# Patient Record
Sex: MALE | Race: OTHER | HISPANIC OR LATINO | URBAN - METROPOLITAN AREA
[De-identification: names, ages, dates, MRNs, and addresses within clinical notes are randomized per-mention and may not be internally consistent; named-entity substitution may affect disease eponyms.]

---

## 2022-02-26 ENCOUNTER — EMERGENCY (EMERGENCY)
Facility: HOSPITAL | Age: 42
LOS: 0 days | Discharge: HOME | End: 2022-02-26
Attending: STUDENT IN AN ORGANIZED HEALTH CARE EDUCATION/TRAINING PROGRAM
Payer: MEDICAID

## 2022-02-26 VITALS
TEMPERATURE: 98 F | SYSTOLIC BLOOD PRESSURE: 146 MMHG | WEIGHT: 244.93 LBS | HEIGHT: 71 IN | RESPIRATION RATE: 19 BRPM | OXYGEN SATURATION: 100 % | HEART RATE: 66 BPM | DIASTOLIC BLOOD PRESSURE: 90 MMHG

## 2022-02-26 DIAGNOSIS — M79.601 PAIN IN RIGHT ARM: ICD-10-CM
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
## 2022-02-26 DIAGNOSIS — M54.50 LOW BACK PAIN, UNSPECIFIED: ICD-10-CM
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
## 2022-02-26 DIAGNOSIS — Z90.49 ACQUIRED ABSENCE OF OTHER SPECIFIED PARTS OF DIGESTIVE TRACT: Chronic | ICD-10-CM

## 2022-02-26 DIAGNOSIS — S39.012A STRAIN OF MUSCLE, FASCIA AND TENDON OF LOWER BACK, INITIAL ENCOUNTER: ICD-10-CM

## 2022-02-26 DIAGNOSIS — T14.8XXA OTHER INJURY OF UNSPECIFIED BODY REGION, INITIAL ENCOUNTER: ICD-10-CM

## 2022-02-26 PROCEDURE — 70450 CT HEAD/BRAIN W/O DYE: CPT | Mod: 26,MA

## 2022-02-26 PROCEDURE — 73030 X-RAY EXAM OF SHOULDER: CPT | Mod: 26,RT

## 2022-02-26 PROCEDURE — 73060 X-RAY EXAM OF HUMERUS: CPT | Mod: 26,RT

## 2022-02-26 PROCEDURE — 99285 EMERGENCY DEPT VISIT HI MDM: CPT

## 2022-02-26 PROCEDURE — 72131 CT LUMBAR SPINE W/O DYE: CPT | Mod: 26,MA

## 2022-02-26 PROCEDURE — 73080 X-RAY EXAM OF ELBOW: CPT | Mod: 26,RT

## 2022-02-26 PROCEDURE — 72125 CT NECK SPINE W/O DYE: CPT | Mod: 26,MA

## 2022-02-26 RX ORDER — ACETAMINOPHEN 500 MG
975 TABLET ORAL ONCE
Refills: 0 | Status: COMPLETED | OUTPATIENT
Start: 2022-02-26 | End: 2022-02-26

## 2022-02-26 RX ORDER — DIAZEPAM 5 MG
5 TABLET ORAL ONCE
Refills: 0 | Status: DISCONTINUED | OUTPATIENT
Start: 2022-02-26 | End: 2022-02-26

## 2022-02-26 RX ORDER — METHOCARBAMOL 500 MG/1
2 TABLET, FILM COATED ORAL
Qty: 30 | Refills: 0
Start: 2022-02-26 | End: 2022-03-02

## 2022-02-26 RX ORDER — IBUPROFEN 200 MG
1 TABLET ORAL
Qty: 20 | Refills: 0
Start: 2022-02-26 | End: 2022-03-02

## 2022-02-26 RX ORDER — LIDOCAINE 4 G/100G
2 CREAM TOPICAL ONCE
Refills: 0 | Status: COMPLETED | OUTPATIENT
Start: 2022-02-26 | End: 2022-02-26

## 2022-02-26 RX ORDER — TRAMADOL HYDROCHLORIDE 50 MG/1
50 TABLET ORAL ONCE
Refills: 0 | Status: DISCONTINUED | OUTPATIENT
Start: 2022-02-26 | End: 2022-02-26

## 2022-02-26 RX ADMIN — Medication 975 MILLIGRAM(S): at 18:14

## 2022-02-26 RX ADMIN — Medication 5 MILLIGRAM(S): at 16:35

## 2022-02-26 RX ADMIN — TRAMADOL HYDROCHLORIDE 50 MILLIGRAM(S): 50 TABLET ORAL at 18:23

## 2022-02-26 RX ADMIN — LIDOCAINE 2 PATCH: 4 CREAM TOPICAL at 16:35

## 2022-02-26 RX ADMIN — Medication 975 MILLIGRAM(S): at 16:35

## 2022-02-26 NOTE — ED ADULT NURSE NOTE - OBJECTIVE STATEMENT
c/o slip and fall on ice with pain to mid low back and right arm. limited ROM on right arm. denies LOC/HA/dizziness/n/v/d at this time

## 2022-02-26 NOTE — ED PROVIDER NOTE - CLINICAL SUMMARY MEDICAL DECISION MAKING FREE TEXT BOX
42y Male with no sig PMHx presents to the ER for fall. 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 +mid-low back pain and right arm pain. Denies pain medications, blood thinners, numbness, tingling, bowel or bladder incontinence or retention, saddle anesthesia. Vital signs stable, exam as noted above. Concern for MSK pain/spasm vs fracture - will get XR, CTs, meds, reassess. Dispo pending results.

## 2022-02-26 NOTE — ED PROVIDER NOTE - PATIENT PORTAL LINK FT
You can access the FollowMyHealth Patient Portal offered by [facility] by registering at the following website: [email or web address]. By joining [employer]’s FollowMyHealth portal, you will also be able to view your health information using other applications (apps) compatible with our system.

## 2022-02-26 NOTE — ED PROVIDER NOTE - NS ED ROS FT
Constitutional: (-) Fever, (-) Chills  Skin: (-) Rashes  Eyes: (-) Visual changes, (-) Discharge, (-) Redness  Ears: (-) Hearing loss, (-)Tinnitus, (-) Ear pain  Nose: (-) Nasal congestion, (-) Runny nose  Mouth/Throat: (-) Sore throat  CV: (-) Chest pain  Resp: (-) Cough, (-) Shortness of breath, (-) Dyspnea on Exertion, (-) Wheezing  GI: (-) Abdominal pain, (-) Nausea, (-) Vomiting, (-) Diarrhea  : (-) Dysuria   MSK: (+)Back pain, (+) Myalgias  Neuro: (-) Headache, (-)LOC

## 2022-02-26 NOTE — ED PROVIDER NOTE - ATTENDING CONTRIBUTION TO CARE
I have seen the patient with the PA and agree with above examination and assessment and plan with the following addendum:    42 year old male presents today s/p slip and fall, pt reports slipping on ice as he got out of his car, now with right shoulder, arm and elbow pain, +head contusion ?LOC, and lower back pain    Focused PE:   General: NAD, alert and oriented  Head: Normocephalic, atraumatic  Eyes: PERRLA, EOMI  Cardiac: RRR, no murmurs, rubs or gallops  Resp: CTA, no wheezes, rales or ronchi  GI: Nondistended, nontender, no rebound or guarding  MSK: +right shoulder/arm /elbow tenderness +LS midline spinal tenderness +paraspinal cervical and lumbar muscle tenderness  Neuro: Alert and oriented, no focal deficits. Normal gait  Ext: Non edematous, nontender.    A/P: pain control, ct, xray, likely discharge is imaging is negative with pain medication

## 2022-02-26 NOTE — ED ADULT NURSE REASSESSMENT NOTE - NS ED NURSE REASSESS COMMENT FT1
Covering RN: Pt resting in stretcher, axo4, reporting to still have pain, 7/10. MD Garcia made aware.

## 2022-02-26 NOTE — ED PROVIDER NOTE - OBJECTIVE STATEMENT
42y Male with no sig PMHx presents to the ER for fall. 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 Unsure if he hit his head or loss consciousness. Reports mid-low back pain and right arm pain. Denies pain medications, blood thinners, numbness, tingling, bowel or bladder incontinence or retention, saddle anesthesia. Reports pain worse with ambulation. Declines

## 2023-02-23 PROBLEM — Z78.9 OTHER SPECIFIED HEALTH STATUS: Chronic | Status: ACTIVE | Noted: 2022-02-26

## 2023-02-23 PROBLEM — Z00.00 ENCOUNTER FOR PREVENTIVE HEALTH EXAMINATION: Status: ACTIVE | Noted: 2023-02-23

## 2023-04-11 ENCOUNTER — APPOINTMENT (OUTPATIENT)
Dept: ORTHOPEDIC SURGERY | Facility: CLINIC | Age: 43
End: 2023-04-11
Payer: COMMERCIAL

## 2023-04-11 VITALS — HEIGHT: 71 IN | WEIGHT: 260 LBS | BODY MASS INDEX: 36.4 KG/M2

## 2023-04-11 DIAGNOSIS — M51.36 OTHER INTERVERTEBRAL DISC DEGENERATION, LUMBAR REGION: ICD-10-CM

## 2023-04-11 DIAGNOSIS — M21.372 FOOT DROP, LEFT FOOT: ICD-10-CM

## 2023-04-11 PROCEDURE — 99204 OFFICE O/P NEW MOD 45 MIN: CPT

## 2023-04-11 PROCEDURE — 72050 X-RAY EXAM NECK SPINE 4/5VWS: CPT

## 2023-04-11 PROCEDURE — 72110 X-RAY EXAM L-2 SPINE 4/>VWS: CPT

## 2023-04-11 PROCEDURE — 72170 X-RAY EXAM OF PELVIS: CPT

## 2023-04-11 RX ORDER — METHYLPREDNISOLONE 4 MG/1
4 TABLET ORAL
Qty: 1 | Refills: 0 | Status: ACTIVE | COMMUNITY
Start: 2023-04-11 | End: 1900-01-01

## 2023-04-11 RX ORDER — GABAPENTIN 100 MG/1
100 CAPSULE ORAL
Qty: 60 | Refills: 2 | Status: ACTIVE | COMMUNITY
Start: 2023-04-11 | End: 1900-01-01

## 2023-04-11 RX ORDER — NAPROXEN 500 MG/1
500 TABLET ORAL
Qty: 60 | Refills: 0 | Status: ACTIVE | COMMUNITY
Start: 2023-04-11 | End: 1900-01-01

## 2023-04-11 NOTE — ASSESSMENT
[FreeTextEntry1] : Severe LLE radiculopathy with partial foot drop\par MDP, iam, nsaids\par Will obtain MRI to rule out HNP; will also be used to guide potential future injections/surgical management. \par \par NSAIDs- Patient warned of risk of medication to GI tract, increased blood pressure, cardiac risk, and risk of fluid retention.  Advised to clear medication with internist or PCP if any concurrent health problem with heart, blood pressure, or GI system exists.\par Gabapentin- Patient advised of sedating effects, instructed not to drive, operate machinery, or take with other sedating medications. Advised of need to taper on/off medication and risk of abruptly stopping gabapentin.

## 2023-04-11 NOTE — IMAGING
[Straightening consistent with spasm] : Straightening consistent with spasm [Disc space narrowing] : Disc space narrowing [AP] : anteroposterior [There are no fractures, subluxations or dislocations. No significant abnormalities are seen] : There are no fractures, subluxations or dislocations. No significant abnormalities are seen [de-identified] : LSPINE\par Inspection: No rash or ecchymosis\par Palpation: L SIJ TTP\par ROM: limited all planes\par Strength: 5/5 RIGHT hip flexors, knee extensors, ankle dorsiflexors, EHL, ankle plantarflexors. Left TA/EHL 4/5, otherwise 5/5\par Sensation: Sensation present to light touch bilateral L2-S1 distributions, N/T posterolateral L leg\par Provocative maneuvers: + Right contra-lateral straight leg raise; + L SLR\par \par Bilateral hips-\par Palpation: No tenderness to palpation over greater trochanter or IT band\par ROM: No pain with flexion and internal rotation

## 2023-04-11 NOTE — HISTORY OF PRESENT ILLNESS
[Burning] : burning [Sharp] : sharp [Stabbing] : stabbing [Constant] : constant [Nothing helps with pain getting better] : Nothing helps with pain getting better [Standing] : standing [Walking] : walking [Exercising] : exercising [10] : 10 [de-identified] : PMH healthy\par PSH appy\par SH no tob, occ etoh, no drugs\par Occ: construction\par \par \par 4/11/23-\par PRISCILA 43 year old M here for Lower back, onset pain sine 2/11-18/23, pt was getting out a truck and slipped on ice, pt felt the pain and was unable to walk, he was taken to the ER, pt completed x rays\par pt was given muscle relaxer and pain medication , which provided only temporary relief, managed by hospital and PCP\par numbness down the LLE to the toes. + subjective L foot weakness\par pt tried a chiropractor but the relief was only during the sessions \par No bb dysfunction [] : no [FreeTextEntry6] : pinching sensation  [FreeTextEntry7] : OYSHI  [de-identified] : bending [de-identified] : 11/2023 [de-identified] : PCP [de-identified] : x ray

## 2023-04-20 ENCOUNTER — FORM ENCOUNTER (OUTPATIENT)
Age: 43
End: 2023-04-20

## 2023-04-20 ENCOUNTER — APPOINTMENT (OUTPATIENT)
Dept: MRI IMAGING | Facility: CLINIC | Age: 43
End: 2023-04-20

## 2023-04-21 ENCOUNTER — APPOINTMENT (OUTPATIENT)
Dept: MRI IMAGING | Facility: CLINIC | Age: 43
End: 2023-04-21
Payer: COMMERCIAL

## 2023-04-21 PROCEDURE — 72148 MRI LUMBAR SPINE W/O DYE: CPT

## 2023-05-30 ENCOUNTER — APPOINTMENT (OUTPATIENT)
Dept: ORTHOPEDIC SURGERY | Facility: CLINIC | Age: 43
End: 2023-05-30
Payer: COMMERCIAL

## 2023-05-30 PROCEDURE — 99215 OFFICE O/P EST HI 40 MIN: CPT

## 2023-05-30 NOTE — REASON FOR VISIT
[FreeTextEntry1] : 42-year-old male with a past medical history significant for\par \par Appendectomy\par \par Review of systems\par 14 point review of systems otherwise unremarkable separate described above in the history of present illness.  The patient was seen on 2/26 of 2022 following fall when he slipped on ice and fell on his back.  Imaging on 2/26 2022 demonstrated no acute intracranial findings of CT scan of the head or CT cervical spine.  No acute fracture of lumbar spine.  There is congenital spinal canal stenosis at least moderate at L3-L4 and L4-L5.  X-ray of the shoulder demonstrate no acute fracture or dislocation.  X-ray of right humerus demonstrate no acute fracture or dislocation.  X-ray of elbow demonstrate no acute fracture or dislocation.\par \par Social history\par No alcohol use, tobacco use or illicit drug use\par \par Past medical history\par Noncontributory\par \par Past surgical history\par Appendectomy\par \par Family history\par Noncontributory for premature coronary disease or sudden cardiac death\par \par All questions and concerns of the patient were addressed.

## 2023-06-01 ENCOUNTER — APPOINTMENT (OUTPATIENT)
Dept: CARDIOLOGY | Facility: CLINIC | Age: 43
End: 2023-06-01

## 2023-06-08 NOTE — IMAGING
[Straightening consistent with spasm] : Straightening consistent with spasm [Disc space narrowing] : Disc space narrowing [AP] : anteroposterior [There are no fractures, subluxations or dislocations. No significant abnormalities are seen] : There are no fractures, subluxations or dislocations. No significant abnormalities are seen [de-identified] : LSPINE\par Inspection: No rash or ecchymosis\par Palpation: L SIJ TTP\par ROM: limited all planes\par Strength: 5/5 RIGHT hip flexors, knee extensors, ankle dorsiflexors, EHL, ankle plantarflexors. Left TA/EHL 4/5, otherwise 5/5\par Sensation: Sensation present to light touch bilateral L2-S1 distributions, N/T posterolateral L leg to dorsal and plantar foot\par Provocative maneuvers: + Right contra-lateral straight leg raise; + L SLR\par \par Bilateral hips-\par Palpation: No tenderness to palpation over greater trochanter or IT band\par ROM: No pain with flexion and internal rotation

## 2023-06-08 NOTE — HISTORY OF PRESENT ILLNESS
[7] : 7 [Burning] : burning [Sharp] : sharp [Stabbing] : stabbing [Constant] : constant [Nothing helps with pain getting better] : Nothing helps with pain getting better [Standing] : standing [Walking] : walking [Exercising] : exercising [de-identified] : PMH healthy\par PSH appy\par SH no tob, occ etoh, no drugs\par Occ: construction\par \par 4/21/23 Lumbar MRI  - report noted in chart. \par Findings: There is congenital spinal stenosis with shortened interpedicular distances. There is no acute vertebral \par body fracture. The conus appears unremarkable. There are no gross paravertebral soft tissue masses.\par L1-L2: There is no posterior disc herniation.\par L2-L3: There is disc bulging, broad-based posterior disc herniation, mild central stenosis, bilateral facet \par arthrosis and moderate bilateral foraminal narrowing with impingement upon the right exiting L2 nerve root.\par L3-L4: There is disc bulging, bony ridging, facet arthrosis, broad-based posterior disc herniation, moderate \par central stenosis, and moderate bilateral foraminal narrowing.\par L4-L5: There is disc bulging, bony ridging, broad-based posterior disc herniation, facet arthrosis, severe central \par stenosis, broad-based posterior disc herniation, bilateral exiting L4 nerve root impingement.\par L5-S1: There is disc bulging, bony ridging, facet arthrosis, broad-based posterior disc herniation asymmetric on \par the left, moderate central stenosis, left S1 nerve root impingement, and bilateral exiting L5 nerve root \par impingement.\par Ind. review- \par Congenital stenosis;\par L4/5 bulge with central and b/l LR and NF stenosis;\par L5/S1 central and b/l NF stenosis w/ L paracentral HNP abutting traversing root\par ------------------------------\par 4/11/23-\par PRISCILA 43 year old M here for Lower back, onset pain since 2/11-18/23, pt was getting out a truck and slipped on ice, pt felt the pain and was unable to walk, he was taken to the ER, pt completed x rays\par pt was given muscle relaxer and pain medication , which provided only temporary relief, managed by hospital and PCP\par numbness down the LLE to the toes. + subjective L foot weakness\par pt tried a chiropractor but the relief was only during the sessions \par No bb dysfunction\par 5/30/23- Still pain radiating down the LLE. MDP helped with pain, taking nsaid and iam with mild relief. No bb dysfunction or saddle anesthesia symptoms. Did not have cramping back or leg pain and was able to walk a significant distance prior to this fall.  [] : no [FreeTextEntry6] : pinching sensation  [FreeTextEntry7] : YOSHI  [de-identified] : bending [de-identified] : 11/2023 [de-identified] : PCP [de-identified] : x ray  [de-identified] : MRI

## 2023-06-08 NOTE — ASSESSMENT
[FreeTextEntry1] : Congenital stenosis;\par L4/5 bulge with central and b/l LR and NF stenosis;\par L5/S1 central and b/l NF stenosis w/ L paracentral HNP abutting traversing root\par \par Discussed MARQUES\par \par Not experiencing symptoms of claudication now or prior to fall, suspect his more acute issues are L4/5 and L5/S1\par \par \par Patient has failed a trial of conservative measures including chiro care, medications, is interested in more definitive interventions.\par \par Indicating for L4-S1 bilateral laminectomy to decompress the neural elements\par Laminectomy- We've discussed the surgery details including but not limited to pain, scar, bleeding, and infection. There is also a possibility for complications such as failure or fracture of bone requiring instrumentation and fusion, and need for future surgery. There is also a possibility for recurrent or residual stenosis or disc herniation. Finally, we discussed potential for injury to nerves, the spinal cord either transient or permanent, damage to blood vessels, CSF leak, blindness, need for transfusion, and medical complications. The patient verbalized understanding and all questions were answered. \par \par NSAIDs- Patient warned of risk of medication to GI tract, increased blood pressure, cardiac risk, and risk of fluid retention.  Advised to clear medication with internist or PCP if any concurrent health problem with heart, blood pressure, or GI system exists.\par \par Gabapentin- Patient advised of sedating effects, instructed not to drive, operate machinery, or take with other sedating medications. Advised of need to taper on/off medication and risk of abruptly stopping gabapentin.

## 2023-09-11 ENCOUNTER — OUTPATIENT (OUTPATIENT)
Dept: OUTPATIENT SERVICES | Facility: HOSPITAL | Age: 43
LOS: 1 days | Discharge: HOME | End: 2023-09-11

## 2023-09-11 ENCOUNTER — TRANSCRIPTION ENCOUNTER (OUTPATIENT)
Age: 43
End: 2023-09-11

## 2023-09-11 VITALS
RESPIRATION RATE: 16 BRPM | TEMPERATURE: 98 F | DIASTOLIC BLOOD PRESSURE: 85 MMHG | SYSTOLIC BLOOD PRESSURE: 131 MMHG | HEART RATE: 64 BPM | HEIGHT: 71 IN | OXYGEN SATURATION: 95 % | WEIGHT: 251.99 LBS

## 2023-09-11 DIAGNOSIS — M54.16 RADICULOPATHY, LUMBAR REGION: ICD-10-CM

## 2023-09-11 DIAGNOSIS — Z90.49 ACQUIRED ABSENCE OF OTHER SPECIFIED PARTS OF DIGESTIVE TRACT: Chronic | ICD-10-CM

## 2023-09-11 DIAGNOSIS — Z01.818 ENCOUNTER FOR OTHER PREPROCEDURAL EXAMINATION: ICD-10-CM

## 2023-09-11 LAB
ANION GAP SERPL CALC-SCNC: 6 MMOL/L — SIGNIFICANT CHANGE UP (ref 5–17)
BUN SERPL-MCNC: 16 MG/DL — SIGNIFICANT CHANGE UP (ref 7–23)
CALCIUM SERPL-MCNC: 9.1 MG/DL — SIGNIFICANT CHANGE UP (ref 8.5–10.1)
CHLORIDE SERPL-SCNC: 107 MMOL/L — SIGNIFICANT CHANGE UP (ref 96–108)
CO2 SERPL-SCNC: 28 MMOL/L — SIGNIFICANT CHANGE UP (ref 22–31)
CREAT SERPL-MCNC: 0.87 MG/DL — SIGNIFICANT CHANGE UP (ref 0.5–1.3)
EGFR: 109 ML/MIN/1.73M2 — SIGNIFICANT CHANGE UP
GLUCOSE SERPL-MCNC: 103 MG/DL — HIGH (ref 70–99)
HCT VFR BLD CALC: 45.9 % — SIGNIFICANT CHANGE UP (ref 39–50)
HGB BLD-MCNC: 15.5 G/DL — SIGNIFICANT CHANGE UP (ref 13–17)
MCHC RBC-ENTMCNC: 30.4 PG — SIGNIFICANT CHANGE UP (ref 27–34)
MCHC RBC-ENTMCNC: 33.8 G/DL — SIGNIFICANT CHANGE UP (ref 32–36)
MCV RBC AUTO: 90 FL — SIGNIFICANT CHANGE UP (ref 80–100)
NRBC # BLD: 0 /100 WBCS — SIGNIFICANT CHANGE UP (ref 0–0)
PLATELET # BLD AUTO: 161 K/UL — SIGNIFICANT CHANGE UP (ref 150–400)
POTASSIUM SERPL-MCNC: 4.6 MMOL/L — SIGNIFICANT CHANGE UP (ref 3.5–5.3)
POTASSIUM SERPL-SCNC: 4.6 MMOL/L — SIGNIFICANT CHANGE UP (ref 3.5–5.3)
RBC # BLD: 5.1 M/UL — SIGNIFICANT CHANGE UP (ref 4.2–5.8)
RBC # FLD: 12.2 % — SIGNIFICANT CHANGE UP (ref 10.3–14.5)
SODIUM SERPL-SCNC: 141 MMOL/L — SIGNIFICANT CHANGE UP (ref 135–145)
WBC # BLD: 6.39 K/UL — SIGNIFICANT CHANGE UP (ref 3.8–10.5)
WBC # FLD AUTO: 6.39 K/UL — SIGNIFICANT CHANGE UP (ref 3.8–10.5)

## 2023-09-11 RX ORDER — SODIUM CHLORIDE 9 MG/ML
3 INJECTION INTRAMUSCULAR; INTRAVENOUS; SUBCUTANEOUS EVERY 8 HOURS
Refills: 0 | Status: DISCONTINUED | OUTPATIENT
Start: 2023-09-18 | End: 2023-09-21

## 2023-09-11 NOTE — H&P PST ADULT - HISTORY OF PRESENT ILLNESS
43M no pmhx c/o low back pain associated with LLE numbness and tingling 2/2 radiculopathy here for PST for scheduled bilateral laminectomy L4-S1 with Dr. Gallegos on 9-  This patient denies any fever, cough, sob, flu like symptoms or travel outside of the [place] in the past 30 days

## 2023-09-11 NOTE — H&P PST ADULT - NSANTHOSAYNRD_GEN_A_CORE
No. VINAYAK screening performed.  STOP BANG Legend: 0-2 = LOW Risk; 3-4 = INTERMEDIATE Risk; 5-8 = HIGH Risk

## 2023-09-11 NOTE — H&P PST ADULT - ASSESSMENT
43M no pmhx c/o low back pain associated with LLE numbness and tingling 2/2 radiculopathy here for PST for scheduled bilateral laminectomy L4-S1 with Dr. Gallegos on 2023  CAPRINI SCORE    AGE RELATED RISK FACTORS                                                       MOBILITY RELATED FACTORS  [ x] Age 41-60 years                                            (1 Point)                  [ ] Bed rest                                                        (1 Point)  [ ] Age: 61-74 years                                           (2 Points)                [ ] Plaster cast                                                   (2 Points)  [ ] Age= 75 years                                              (3 Points)                 [ ] Bed bound for more than 72 hours                   (2 Points)    DISEASE RELATED RISK FACTORS                                               GENDER SPECIFIC FACTORS  [ ] Edema in the lower extremities                       (1 Point)                  [ ] Pregnancy                                                     (1 Point)  [ ] Varicose veins                                               (1 Point)                  [ ] Post-partum < 6 weeks                                   (1 Point)             [x ] BMI > 25 Kg/m2                                            (1 Point)                  [ ] Hormonal therapy  or oral contraception            (1 Point)                 [ ] Sepsis (in the previous month)                        (1 Point)                  [ ] History of pregnancy complications  [ ] Pneumonia or serious lung disease                                               [ ] Unexplained or recurrent                       (1 Point)           (in the previous month)                               (1 Point)  [ ] Abnormal pulmonary function test                     (1 Point)                 SURGERY RELATED RISK FACTORS  [ ] Acute myocardial infarction                              (1 Point)                 [ ]  Section                                            (1 Point)  [ ] Congestive heart failure (in the previous month)  (1 Point)                 [ ] Minor surgery                                                 (1 Point)   [ ] Inflammatory bowel disease                             (1 Point)                 [ ] Arthroscopic surgery                                        (2 Points)  [ ] Central venous access                                    (2 Points)                [ x] General surgery lasting more than 45 minutes   (2 Points)       [ ] Stroke (in the previous month)                          (5 Points)               [ ] Elective arthroplasty                                        (5 Points)                                                                                                                                               HEMATOLOGY RELATED FACTORS                                                 TRAUMA RELATED RISK FACTORS  [ ] Prior episodes of VTE                                     (3 Points)                 [ ] Fracture of the hip, pelvis, or leg                       (5 Points)  [ ] Positive family history for VTE                         (3 Points)                 [ ] Acute spinal cord injury (in the previous month)  (5 Points)  [ ] Prothrombin 48096 A                                      (3 Points)                 [ ] Paralysis  (less than 1 month)                          (5 Points)  [ ] Factor V Leiden                                             (3 Points)                 [ ] Multiple Trauma within 1 month                         (5 Points)  [ ] Lupus anticoagulants                                     (3 Points)                                                           [ ] Anticardiolipin antibodies                                (3 Points)                                                       [ ] High homocysteine in the blood                      (3 Points)                                             [ ] Other congenital or acquired thrombophilia       (3 Points)                                                [ ] Heparin induced thrombocytopenia                  (3 Points)                                          Total Score [   4       ]

## 2023-09-11 NOTE — H&P PST ADULT - PROBLEM SELECTOR PLAN 1
bilateral laminectomy L4-S1  Pre-op instructions given by RN, patient verbalized understanding  Chlorhexidine wash instructions given   Anesthesiologist to review PST labs, EKG, required clearances and optimization for surgery.

## 2023-09-17 ENCOUNTER — TRANSCRIPTION ENCOUNTER (OUTPATIENT)
Age: 43
End: 2023-09-17

## 2023-09-18 ENCOUNTER — APPOINTMENT (OUTPATIENT)
Dept: ORTHOPEDIC SURGERY | Facility: HOSPITAL | Age: 43
End: 2023-09-18
Payer: COMMERCIAL

## 2023-09-18 ENCOUNTER — INPATIENT (INPATIENT)
Facility: HOSPITAL | Age: 43
LOS: 2 days | Discharge: HOME HEALTH | End: 2023-09-21
Attending: ORTHOPAEDIC SURGERY | Admitting: ORTHOPAEDIC SURGERY
Payer: COMMERCIAL

## 2023-09-18 VITALS
RESPIRATION RATE: 16 BRPM | HEART RATE: 73 BPM | DIASTOLIC BLOOD PRESSURE: 92 MMHG | WEIGHT: 251.99 LBS | TEMPERATURE: 98 F | HEIGHT: 71 IN | OXYGEN SATURATION: 97 % | SYSTOLIC BLOOD PRESSURE: 156 MMHG

## 2023-09-18 DIAGNOSIS — Z90.49 ACQUIRED ABSENCE OF OTHER SPECIFIED PARTS OF DIGESTIVE TRACT: Chronic | ICD-10-CM

## 2023-09-18 LAB
ABO RH CONFIRMATION: SIGNIFICANT CHANGE UP
ANION GAP SERPL CALC-SCNC: 1 MMOL/L — LOW (ref 5–17)
BASOPHILS # BLD AUTO: 0.05 K/UL — SIGNIFICANT CHANGE UP (ref 0–0.2)
BASOPHILS NFR BLD AUTO: 0.5 % — SIGNIFICANT CHANGE UP (ref 0–2)
BLD GP AB SCN SERPL QL: SIGNIFICANT CHANGE UP
BUN SERPL-MCNC: 19 MG/DL — SIGNIFICANT CHANGE UP (ref 7–23)
CALCIUM SERPL-MCNC: 8.3 MG/DL — LOW (ref 8.5–10.1)
CHLORIDE SERPL-SCNC: 106 MMOL/L — SIGNIFICANT CHANGE UP (ref 96–108)
CO2 SERPL-SCNC: 30 MMOL/L — SIGNIFICANT CHANGE UP (ref 22–31)
CREAT SERPL-MCNC: 1.24 MG/DL — SIGNIFICANT CHANGE UP (ref 0.5–1.3)
EGFR: 74 ML/MIN/1.73M2 — SIGNIFICANT CHANGE UP
EOSINOPHIL # BLD AUTO: 0.09 K/UL — SIGNIFICANT CHANGE UP (ref 0–0.5)
EOSINOPHIL NFR BLD AUTO: 0.8 % — SIGNIFICANT CHANGE UP (ref 0–6)
GLUCOSE SERPL-MCNC: 151 MG/DL — HIGH (ref 70–99)
HCT VFR BLD CALC: 44.7 % — SIGNIFICANT CHANGE UP (ref 39–50)
HGB BLD-MCNC: 14.5 G/DL — SIGNIFICANT CHANGE UP (ref 13–17)
IMM GRANULOCYTES NFR BLD AUTO: 0.4 % — SIGNIFICANT CHANGE UP (ref 0–0.9)
LYMPHOCYTES # BLD AUTO: 3.29 K/UL — SIGNIFICANT CHANGE UP (ref 1–3.3)
LYMPHOCYTES # BLD AUTO: 30.7 % — SIGNIFICANT CHANGE UP (ref 13–44)
MCHC RBC-ENTMCNC: 30.2 PG — SIGNIFICANT CHANGE UP (ref 27–34)
MCHC RBC-ENTMCNC: 32.4 G/DL — SIGNIFICANT CHANGE UP (ref 32–36)
MCV RBC AUTO: 93.1 FL — SIGNIFICANT CHANGE UP (ref 80–100)
MONOCYTES # BLD AUTO: 0.33 K/UL — SIGNIFICANT CHANGE UP (ref 0–0.9)
MONOCYTES NFR BLD AUTO: 3.1 % — SIGNIFICANT CHANGE UP (ref 2–14)
NEUTROPHILS # BLD AUTO: 6.93 K/UL — SIGNIFICANT CHANGE UP (ref 1.8–7.4)
NEUTROPHILS NFR BLD AUTO: 64.5 % — SIGNIFICANT CHANGE UP (ref 43–77)
NRBC # BLD: 0 /100 WBCS — SIGNIFICANT CHANGE UP (ref 0–0)
PLATELET # BLD AUTO: 179 K/UL — SIGNIFICANT CHANGE UP (ref 150–400)
POTASSIUM SERPL-MCNC: 5 MMOL/L — SIGNIFICANT CHANGE UP (ref 3.5–5.3)
POTASSIUM SERPL-SCNC: 5 MMOL/L — SIGNIFICANT CHANGE UP (ref 3.5–5.3)
RBC # BLD: 4.8 M/UL — SIGNIFICANT CHANGE UP (ref 4.2–5.8)
RBC # FLD: 12.2 % — SIGNIFICANT CHANGE UP (ref 10.3–14.5)
SODIUM SERPL-SCNC: 137 MMOL/L — SIGNIFICANT CHANGE UP (ref 135–145)
WBC # BLD: 10.73 K/UL — HIGH (ref 3.8–10.5)
WBC # FLD AUTO: 10.73 K/UL — HIGH (ref 3.8–10.5)

## 2023-09-18 PROCEDURE — 63048 LAM FACETEC &FORAMOT EA ADDL: CPT | Mod: 62

## 2023-09-18 PROCEDURE — 63047 LAM FACETEC & FORAMOT LUMBAR: CPT | Mod: 62

## 2023-09-18 DEVICE — SURGIFLO MATRIX WITH THROMBIN KIT: Type: IMPLANTABLE DEVICE | Status: FUNCTIONAL

## 2023-09-18 DEVICE — BONE WAX 2.5G NON ABSORBABLE: Type: IMPLANTABLE DEVICE | Status: FUNCTIONAL

## 2023-09-18 RX ORDER — OXYCODONE HYDROCHLORIDE 5 MG/1
10 TABLET ORAL EVERY 4 HOURS
Refills: 0 | Status: DISCONTINUED | OUTPATIENT
Start: 2023-09-18 | End: 2023-09-21

## 2023-09-18 RX ORDER — HYDROMORPHONE HYDROCHLORIDE 2 MG/ML
0.5 INJECTION INTRAMUSCULAR; INTRAVENOUS; SUBCUTANEOUS ONCE
Refills: 0 | Status: DISCONTINUED | OUTPATIENT
Start: 2023-09-18 | End: 2023-09-21

## 2023-09-18 RX ORDER — CEFAZOLIN SODIUM 1 G
2000 VIAL (EA) INJECTION EVERY 8 HOURS
Refills: 0 | Status: COMPLETED | OUTPATIENT
Start: 2023-09-18 | End: 2023-09-19

## 2023-09-18 RX ORDER — ACETAMINOPHEN 500 MG
1000 TABLET ORAL ONCE
Refills: 0 | Status: COMPLETED | OUTPATIENT
Start: 2023-09-18 | End: 2023-09-18

## 2023-09-18 RX ORDER — ONDANSETRON 8 MG/1
4 TABLET, FILM COATED ORAL EVERY 6 HOURS
Refills: 0 | Status: DISCONTINUED | OUTPATIENT
Start: 2023-09-18 | End: 2023-09-21

## 2023-09-18 RX ORDER — SENNA PLUS 8.6 MG/1
2 TABLET ORAL AT BEDTIME
Refills: 0 | Status: DISCONTINUED | OUTPATIENT
Start: 2023-09-18 | End: 2023-09-21

## 2023-09-18 RX ORDER — ACETAMINOPHEN 500 MG
650 TABLET ORAL EVERY 6 HOURS
Refills: 0 | Status: DISCONTINUED | OUTPATIENT
Start: 2023-09-18 | End: 2023-09-21

## 2023-09-18 RX ORDER — GABAPENTIN 400 MG/1
300 CAPSULE ORAL ONCE
Refills: 0 | Status: COMPLETED | OUTPATIENT
Start: 2023-09-18 | End: 2023-09-18

## 2023-09-18 RX ORDER — PANTOPRAZOLE SODIUM 20 MG/1
40 TABLET, DELAYED RELEASE ORAL
Refills: 0 | Status: DISCONTINUED | OUTPATIENT
Start: 2023-09-18 | End: 2023-09-21

## 2023-09-18 RX ORDER — SODIUM CHLORIDE 9 MG/ML
1000 INJECTION, SOLUTION INTRAVENOUS
Refills: 0 | Status: DISCONTINUED | OUTPATIENT
Start: 2023-09-18 | End: 2023-09-18

## 2023-09-18 RX ORDER — CYCLOBENZAPRINE HYDROCHLORIDE 10 MG/1
10 TABLET, FILM COATED ORAL EVERY 8 HOURS
Refills: 0 | Status: DISCONTINUED | OUTPATIENT
Start: 2023-09-18 | End: 2023-09-21

## 2023-09-18 RX ORDER — OXYCODONE HYDROCHLORIDE 5 MG/1
5 TABLET ORAL EVERY 6 HOURS
Refills: 0 | Status: DISCONTINUED | OUTPATIENT
Start: 2023-09-18 | End: 2023-09-21

## 2023-09-18 RX ORDER — HYDROMORPHONE HYDROCHLORIDE 2 MG/ML
0.5 INJECTION INTRAMUSCULAR; INTRAVENOUS; SUBCUTANEOUS
Refills: 0 | Status: DISCONTINUED | OUTPATIENT
Start: 2023-09-18 | End: 2023-09-18

## 2023-09-18 RX ORDER — ONDANSETRON 8 MG/1
4 TABLET, FILM COATED ORAL ONCE
Refills: 0 | Status: DISCONTINUED | OUTPATIENT
Start: 2023-09-18 | End: 2023-09-18

## 2023-09-18 RX ADMIN — SODIUM CHLORIDE 3 MILLILITER(S): 9 INJECTION INTRAMUSCULAR; INTRAVENOUS; SUBCUTANEOUS at 23:38

## 2023-09-18 RX ADMIN — Medication 100 MILLIGRAM(S): at 22:26

## 2023-09-18 RX ADMIN — Medication 400 MILLIGRAM(S): at 23:59

## 2023-09-18 RX ADMIN — GABAPENTIN 300 MILLIGRAM(S): 400 CAPSULE ORAL at 22:29

## 2023-09-18 RX ADMIN — CYCLOBENZAPRINE HYDROCHLORIDE 10 MILLIGRAM(S): 10 TABLET, FILM COATED ORAL at 22:29

## 2023-09-18 RX ADMIN — SODIUM CHLORIDE 75 MILLILITER(S): 9 INJECTION, SOLUTION INTRAVENOUS at 19:35

## 2023-09-18 NOTE — PROGRESS NOTE ADULT - SUBJECTIVE AND OBJECTIVE BOX
Postop Check    Patient tolerated the procedure well. Patient seen and examined at bedside. No acute complaints at this time. Pain well controlled. Denies weakness, numbness or tingling. Denies chest pain, shortness of breath, nausea or vomiting.     PE:  Vital Signs Last 24 Hrs  T(C): 36.7 (09-18-23 @ 20:36), Max: 36.8 (09-18-23 @ 12:14)  T(F): 98.1 (09-18-23 @ 20:36), Max: 98.3 (09-18-23 @ 12:14)  HR: 69 (09-18-23 @ 20:36) (69 - 90)  BP: 134/87 (09-18-23 @ 20:36) (134/87 - 164/73)  BP(mean): --  RR: 15 (09-18-23 @ 20:36) (13 - 24)  SpO2: 100% (09-18-23 @ 20:36) (94% - 100%)    General: NAD, resting comforatbly in bed     Dressing C/D/I  1 Drain present  2+ radial pulses  2+ DP Pulses    Motor:                   C5                C6              C7               C8           T1   R             5/5                5/5            5/5              5/5          5/5  L             5/5                5/5            5/5              5/5          5/5                    L2                  L3             L4              L5            S1  R            5/5                5/5             5/5            5/5          5/5  L             5/5                5/5            5/5            5/5          5/5    Sensory:            C5         C6         C7      C8       T1        (0=absent, 1=impaired, 2=normal, NT=not testable)  R         2            2           2        2         2  L          2            2           2        2         2               L2          L3         L4      L5       S1         (0=absent, 1=impaired, 2=normal, NT=not testable)  R         2            2            2        2        2  L          2            2           2        2         2          A/P:  43y m s/p L4-S1 POD 0  -PT/OT   -WBAT   - Please record drain output  -Pain Control  -DVT ppx: SCDs  -Continue perioperative abx x 24 hours  -FU AM Labs  -Rest, ice, compress and elevate the extremity as we needed  -Incentive Spirometry   Postop Check    Patient tolerated the procedure well. Patient seen and examined at bedside. No acute complaints at this time. Pain well controlled. Denies weakness, numbness or tingling. Denies chest pain, nausea or vomiting. Pt on AVAPS, respiratory therapy at bedside. Pt with hx of VINAYAK.    PE:  Vital Signs Last 24 Hrs  T(C): 36.7 (09-18-23 @ 20:36), Max: 36.8 (09-18-23 @ 12:14)  T(F): 98.1 (09-18-23 @ 20:36), Max: 98.3 (09-18-23 @ 12:14)  HR: 69 (09-18-23 @ 20:36) (69 - 90)  BP: 134/87 (09-18-23 @ 20:36) (134/87 - 164/73)  BP(mean): --  RR: 15 (09-18-23 @ 20:36) (13 - 24)  SpO2: 100% (09-18-23 @ 20:36) (94% - 100%)    General: NAD, resting comforatbly in bed     Dressing C/D/I  1 Drain present  2+ radial pulses  2+ DP Pulses    Motor:                   C5                C6              C7               C8           T1   R             5/5                5/5            5/5              5/5          5/5  L             5/5                5/5            5/5              5/5          5/5                    L2                  L3             L4              L5            S1  R            5/5                5/5             5/5            5/5          5/5  L             5/5                5/5            5/5            5/5          5/5    Sensory:            C5         C6         C7      C8       T1        (0=absent, 1=impaired, 2=normal, NT=not testable)  R         2            2           2        2         2  L          2            2           2        2         2               L2          L3         L4      L5       S1         (0=absent, 1=impaired, 2=normal, NT=not testable)  R         2            2            2        2        2  L          2            2           2        2         2          A/P:  43y m s/p L4-S1 POD 0  -PT/OT   -WBAT   - Please record drain output  -Pain Control  -DVT ppx: SCDs  -Continue perioperative abx x 24 hours  -FU AM Labs  -Rest, ice, compress and elevate the extremity as we needed  -Incentive Spirometry

## 2023-09-18 NOTE — ASU PATIENT PROFILE, ADULT - FALL HARM RISK - HARM RISK INTERVENTIONS

## 2023-09-18 NOTE — BRIEF OPERATIVE NOTE - NSICDXBRIEFPROCEDURE_GEN_ALL_CORE_FT
PROCEDURES:  Laminectomy of lumbar spine at 1 or 2 levels for stenosis 18-Sep-2023 19:39:50  Rajiv Gallegos

## 2023-09-18 NOTE — ASU PATIENT PROFILE, ADULT - CAREGIVER RELATION TO PATIENT
Progress Notes by Melvi Menedz MD at 06/19/17 01:33 PM     Author:  Melvi Mendez MD Service:  (none) Author Type:  Physician     Filed:  06/20/17 01:21 PM Encounter Date:  6/19/2017 Status:  Signed     :  Melvi Mendez MD (Physician)              PEDIATRIC ILLNESS VISIT   6/19/2017        Roomed by: Denise Cheney MA 1:33 PM      SUBJECTIVE  Accompanied by:[JE1.1T]  Mother [phone] ok to leave a message[JE1.1M]   Eunice is a 1 year old female who is complaining of[JE1.1T] bump on neck[JE1.1M].  Present for[JE1.1T] unknown amount of time[RT1.1M] and is[JE1.1T] stable[RT1.1M].  Present treatments include -[JE1.1T] none[RT1.1M].   Previous medical contacts for the problem -[JE1.1T] none[RT1.1M]  Symptoms:  Fever:[JE1.1T]     No elevation of temperature[RT1.1M]  General:[JE1.1T] No irritability or lethargy noted   Heent+Mouth:[RT1.1M]       NECK problems: swollen bump on the left side of the neck, not tender, mom not sure how long but has not had noticed before[RT1.2M]    ROS  All other ROS negative unless indicated otherwise above.    Allergies:  Review of patient's allergies indicates no known allergies.    No current outpatient prescriptions on file.       Family history:[JE1.1T] No other family members have acute illnesses[RT1.2M]    Social history:[JE1.1T] Not in  or school[RT1.2M]       OBJECTIVE:   Physical exam  Pulse 116  Temp 98.1 °F (36.7 °C) (Temporal)   Resp 24  Wt 29 lb (13.2 kg)    GENERAL:[JE1.1T] Normal- alert and no distress noted HEAD & SCALP: No lesions, swelling, tenderness or abnormalities.  EYES: No redness, swelling, drainage or abnormalities.  EARS: No abnormalities of external ears, canals or tm's.  NOSE: No swelling or drainage.  MOUTH: No abnormalities of tongue or mucosal membranes.  THROAT: No redness or lesions.  NECK: on the left side posterior of the neck a 0.75 cm lesion by 0.5 soft mobile mobile, nontender, no erythema  CHEST: Lungs are clear to  auscultation and no retractions.  ABDOMEN: Soft, normal bowel sounds, no organomegaly, masses or tenderness.  SKIN: No rashes, lesions.      ASSES[RT1.2M]SMENT/PLAN[JE1.1T]  Neck nodule, differential is lymph node enlargement or cyst..... Labs and imaging recommended.  Depending on result will consult.  Reassure mom that quality does not seem problematic but will need to see results first.  Rapid strep is negative, plated sent[RT1.2M]      Medication changes:[JE1.1T] No[RT1.2M]    Immunizations given today?[JE1.1T] No.[RT1.2M]  See Orders:  Instructed to call if the problem worsens or does not improve within the next 24 to 48 hours.    Schedule follow-up:[JE1.1T] prn[RT1.2M]    Electronically Signed by:    Melvi Mendez MD , 6/20/2017[RT1.3T]        Revision History        User Key Date/Time User Provider Type Action    > RT1.3 06/20/17 01:21 PM Melvi Mendez MD Physician Sign     RT1.2 06/20/17 01:16 PM Melvi Mendez MD Physician      RT1.1 06/19/17 01:47 PM Melvi Mendez MD Physician      JE1.1 06/19/17 01:33 PM Denise Cheney CMA Medical Assistant Sign at close encounter    M - Manual, T - Template             son

## 2023-09-18 NOTE — ASU PREOP CHECKLIST - STERILIZATION AFFIRMATION
[de-identified] : Patient is a 62 yo F with no significant PMH of fall s/p open reduction and internal fixation. Patient feels well. She is requesting US of her thyroid and her leg (for suspected lipoma) 
n/a

## 2023-09-18 NOTE — ASU PATIENT PROFILE, ADULT - CENTRAL VENOUS CATHETER
Called Access center back and they are now trying [address] [place] [address] and [facility] to see if this patient will be accepted.      Blanche Montgomery  11/14/20 0259 no

## 2023-09-19 ENCOUNTER — TRANSCRIPTION ENCOUNTER (OUTPATIENT)
Age: 43
End: 2023-09-19

## 2023-09-19 LAB
ANION GAP SERPL CALC-SCNC: 3 MMOL/L — LOW (ref 5–17)
BASOPHILS # BLD AUTO: 0.02 K/UL — SIGNIFICANT CHANGE UP (ref 0–0.2)
BASOPHILS NFR BLD AUTO: 0.2 % — SIGNIFICANT CHANGE UP (ref 0–2)
BUN SERPL-MCNC: 18 MG/DL — SIGNIFICANT CHANGE UP (ref 7–23)
CALCIUM SERPL-MCNC: 8.5 MG/DL — SIGNIFICANT CHANGE UP (ref 8.5–10.1)
CHLORIDE SERPL-SCNC: 103 MMOL/L — SIGNIFICANT CHANGE UP (ref 96–108)
CO2 SERPL-SCNC: 30 MMOL/L — SIGNIFICANT CHANGE UP (ref 22–31)
CREAT SERPL-MCNC: 0.85 MG/DL — SIGNIFICANT CHANGE UP (ref 0.5–1.3)
EGFR: 110 ML/MIN/1.73M2 — SIGNIFICANT CHANGE UP
EOSINOPHIL # BLD AUTO: 0 K/UL — SIGNIFICANT CHANGE UP (ref 0–0.5)
EOSINOPHIL NFR BLD AUTO: 0 % — SIGNIFICANT CHANGE UP (ref 0–6)
GLUCOSE BLDC GLUCOMTR-MCNC: 128 MG/DL — HIGH (ref 70–99)
GLUCOSE SERPL-MCNC: 142 MG/DL — HIGH (ref 70–99)
HCT VFR BLD CALC: 38.3 % — LOW (ref 39–50)
HGB BLD-MCNC: 13.1 G/DL — SIGNIFICANT CHANGE UP (ref 13–17)
IMM GRANULOCYTES NFR BLD AUTO: 0.2 % — SIGNIFICANT CHANGE UP (ref 0–0.9)
LYMPHOCYTES # BLD AUTO: 1.5 K/UL — SIGNIFICANT CHANGE UP (ref 1–3.3)
LYMPHOCYTES # BLD AUTO: 14.5 % — SIGNIFICANT CHANGE UP (ref 13–44)
MCHC RBC-ENTMCNC: 31 PG — SIGNIFICANT CHANGE UP (ref 27–34)
MCHC RBC-ENTMCNC: 34.2 G/DL — SIGNIFICANT CHANGE UP (ref 32–36)
MCV RBC AUTO: 90.8 FL — SIGNIFICANT CHANGE UP (ref 80–100)
MONOCYTES # BLD AUTO: 0.64 K/UL — SIGNIFICANT CHANGE UP (ref 0–0.9)
MONOCYTES NFR BLD AUTO: 6.2 % — SIGNIFICANT CHANGE UP (ref 2–14)
NEUTROPHILS # BLD AUTO: 8.13 K/UL — HIGH (ref 1.8–7.4)
NEUTROPHILS NFR BLD AUTO: 78.9 % — HIGH (ref 43–77)
NRBC # BLD: 0 /100 WBCS — SIGNIFICANT CHANGE UP (ref 0–0)
PLATELET # BLD AUTO: 162 K/UL — SIGNIFICANT CHANGE UP (ref 150–400)
POTASSIUM SERPL-MCNC: 4.8 MMOL/L — SIGNIFICANT CHANGE UP (ref 3.5–5.3)
POTASSIUM SERPL-SCNC: 4.8 MMOL/L — SIGNIFICANT CHANGE UP (ref 3.5–5.3)
RBC # BLD: 4.22 M/UL — SIGNIFICANT CHANGE UP (ref 4.2–5.8)
RBC # FLD: 12.3 % — SIGNIFICANT CHANGE UP (ref 10.3–14.5)
SODIUM SERPL-SCNC: 136 MMOL/L — SIGNIFICANT CHANGE UP (ref 135–145)
WBC # BLD: 10.31 K/UL — SIGNIFICANT CHANGE UP (ref 3.8–10.5)
WBC # FLD AUTO: 10.31 K/UL — SIGNIFICANT CHANGE UP (ref 3.8–10.5)

## 2023-09-19 RX ORDER — SIMETHICONE 80 MG/1
80 TABLET, CHEWABLE ORAL THREE TIMES A DAY
Refills: 0 | Status: COMPLETED | OUTPATIENT
Start: 2023-09-19 | End: 2023-09-21

## 2023-09-19 RX ORDER — SODIUM CHLORIDE 9 MG/ML
1000 INJECTION, SOLUTION INTRAVENOUS
Refills: 0 | Status: DISCONTINUED | OUTPATIENT
Start: 2023-09-19 | End: 2023-09-21

## 2023-09-19 RX ORDER — NALOXEGOL OXALATE 12.5 MG/1
25 TABLET, FILM COATED ORAL DAILY
Refills: 0 | Status: DISCONTINUED | OUTPATIENT
Start: 2023-09-19 | End: 2023-09-21

## 2023-09-19 RX ORDER — SODIUM CHLORIDE 9 MG/ML
1000 INJECTION INTRAMUSCULAR; INTRAVENOUS; SUBCUTANEOUS ONCE
Refills: 0 | Status: COMPLETED | OUTPATIENT
Start: 2023-09-19 | End: 2023-09-19

## 2023-09-19 RX ORDER — INFLUENZA VIRUS VACCINE 15; 15; 15; 15 UG/.5ML; UG/.5ML; UG/.5ML; UG/.5ML
0.5 SUSPENSION INTRAMUSCULAR ONCE
Refills: 0 | Status: COMPLETED | OUTPATIENT
Start: 2023-09-19 | End: 2023-09-19

## 2023-09-19 RX ADMIN — SODIUM CHLORIDE 3 MILLILITER(S): 9 INJECTION INTRAMUSCULAR; INTRAVENOUS; SUBCUTANEOUS at 05:58

## 2023-09-19 RX ADMIN — Medication 100 MILLIGRAM(S): at 05:51

## 2023-09-19 RX ADMIN — Medication 650 MILLIGRAM(S): at 06:47

## 2023-09-19 RX ADMIN — CYCLOBENZAPRINE HYDROCHLORIDE 10 MILLIGRAM(S): 10 TABLET, FILM COATED ORAL at 13:42

## 2023-09-19 RX ADMIN — SIMETHICONE 80 MILLIGRAM(S): 80 TABLET, CHEWABLE ORAL at 13:42

## 2023-09-19 RX ADMIN — SODIUM CHLORIDE 3 MILLILITER(S): 9 INJECTION INTRAMUSCULAR; INTRAVENOUS; SUBCUTANEOUS at 13:38

## 2023-09-19 RX ADMIN — SODIUM CHLORIDE 3 MILLILITER(S): 9 INJECTION INTRAMUSCULAR; INTRAVENOUS; SUBCUTANEOUS at 22:01

## 2023-09-19 RX ADMIN — CYCLOBENZAPRINE HYDROCHLORIDE 10 MILLIGRAM(S): 10 TABLET, FILM COATED ORAL at 05:51

## 2023-09-19 RX ADMIN — Medication 650 MILLIGRAM(S): at 17:58

## 2023-09-19 RX ADMIN — Medication 650 MILLIGRAM(S): at 18:45

## 2023-09-19 RX ADMIN — NALOXEGOL OXALATE 25 MILLIGRAM(S): 12.5 TABLET, FILM COATED ORAL at 13:42

## 2023-09-19 RX ADMIN — SIMETHICONE 80 MILLIGRAM(S): 80 TABLET, CHEWABLE ORAL at 21:58

## 2023-09-19 RX ADMIN — OXYCODONE HYDROCHLORIDE 10 MILLIGRAM(S): 5 TABLET ORAL at 11:00

## 2023-09-19 RX ADMIN — PANTOPRAZOLE SODIUM 40 MILLIGRAM(S): 20 TABLET, DELAYED RELEASE ORAL at 05:51

## 2023-09-19 RX ADMIN — SODIUM CHLORIDE 1000 MILLILITER(S): 9 INJECTION INTRAMUSCULAR; INTRAVENOUS; SUBCUTANEOUS at 20:29

## 2023-09-19 RX ADMIN — SENNA PLUS 2 TABLET(S): 8.6 TABLET ORAL at 21:57

## 2023-09-19 RX ADMIN — Medication 650 MILLIGRAM(S): at 12:30

## 2023-09-19 RX ADMIN — Medication 650 MILLIGRAM(S): at 11:27

## 2023-09-19 RX ADMIN — CYCLOBENZAPRINE HYDROCHLORIDE 10 MILLIGRAM(S): 10 TABLET, FILM COATED ORAL at 21:57

## 2023-09-19 RX ADMIN — Medication 650 MILLIGRAM(S): at 05:57

## 2023-09-19 RX ADMIN — OXYCODONE HYDROCHLORIDE 10 MILLIGRAM(S): 5 TABLET ORAL at 10:02

## 2023-09-19 RX ADMIN — Medication 1000 MILLIGRAM(S): at 00:30

## 2023-09-19 NOTE — PHYSICAL THERAPY INITIAL EVALUATION ADULT - STRENGTHENING, PT EVAL
Improve strength in the LE to 5/5, improve general endurance to good and be able to perform functional tasks-bed mobility, sitting, standing, transfers and ambulate in a safe manner with or without  assistive device and prevent falls.

## 2023-09-19 NOTE — RAPID RESPONSE TEAM SUMMARY - NSSITUATIONBACKGROUNDRRT_GEN_ALL_CORE
RRT called tonight for hypotension to 70s/40s.  RN reports pt was sitting up at the side of the bed getting oob, c/o feeling dizzy, noted with diaphoresis.  He was assisted to the chair, bp 79/44 HR 62, RR 21.  No loc/syncope/fall.  Pt denies cp, sob, palpitations, n/v/abd pain, denies prior episodes of syncope/vasovagal. Pt admits to surgical site pain.

## 2023-09-19 NOTE — DISCHARGE NOTE PROVIDER - CARE PROVIDER_API CALL
Rajiv Gallegos  Orthopaedic Surgery  [address] [facility], [address]  [place] [id number]  Phone: [phone]  Fax: [phone]  Follow Up Time:

## 2023-09-19 NOTE — DISCHARGE NOTE PROVIDER - NSDCMRMEDTOKEN_GEN_ALL_CORE_FT
acetaminophen 325 mg oral tablet: 2 tab(s) orally every 6 hours  cyclobenzaprine 10 mg oral tablet: 1 tab(s) orally every 8 hours MDD: 3  Narcan 4 mg/0.1 mL nasal spray: 4 milligram(s) intranasally once , repeat as necessary.   As needed. For suspected opiate overdose   Follow instructions on packet MDD: 0.2 ml  oxyCODONE 5 mg oral tablet: 1 tab(s) orally every 4 hours as needed for  pain 1 tab for mild/moderate pain, 2 tabs for severe pain MDD: 6  senna leaf extract oral tablet: 2 tab(s) orally once a day (at bedtime)  sodium biphosphate-sodium phosphate 19 g-7 g rectal enema: 1 each rectal once As needed constipation

## 2023-09-19 NOTE — DISCHARGE NOTE PROVIDER - NSDCCPTREATMENT_GEN_ALL_CORE_FT
PRINCIPAL PROCEDURE  Procedure: Laminectomy of lumbar spine at 1 or 2 levels for stenosis  Findings and Treatment:

## 2023-09-19 NOTE — DISCHARGE NOTE PROVIDER - NSDCFUADDINST_GEN_ALL_CORE_FT
No bending/lifting/twisting/pulling/pushing/carrying or driving. No blood thinners (not limited to but including) aspirin, motrin, alleve, naproxen, etc.  Prineo dressing.   Keep guaze and tagaderm hospital dressing on for 3 days.   May shower 3 days after surgery and then remove the hospital guaze and tagaderm dressing.  After hospital dressing is removed you can leave the incision open to the air - it has a prineo dressing over the incision.  Prineo dressing care: May shower. May get incision wet with soap/water but no scrubbing incision or dressing. No creams or lotions to incision.   Dr. Gallegos will remove prineo bandage in the office at follow up.

## 2023-09-19 NOTE — CONSULT NOTE ADULT - SUBJECTIVE AND OBJECTIVE BOX
PRISCILA HOPE is a 43y Male s/p BILATERAL LAMINECTOMY L4-S1 WITH IMAGE    EXTENTION SURGERY    EXTENSIVE SURGERY      w/ h/o No pertinent past medical history      denies any chest pain shortness of breath palpitation dizziness lightheadedness nausea vomiting fever or chills    S/P appendectomy        SH: doesnot smoke or drink at this time    No Known Allergies    acetaminophen     Tablet .. 650 milliGRAM(s) Oral every 6 hours  bisacodyl 5 milliGRAM(s) Oral every 12 hours PRN  cyclobenzaprine 10 milliGRAM(s) Oral every 8 hours  HYDROmorphone  Injectable 0.5 milliGRAM(s) IV Push once PRN  influenza   Vaccine 0.5 milliLiter(s) IntraMuscular once  lactated ringers. 1000 milliLiter(s) IV Continuous <Continuous>  naloxegol 25 milliGRAM(s) Oral daily  ondansetron   Disintegrating Tablet 4 milliGRAM(s) Oral every 6 hours PRN  oxyCODONE    IR 5 milliGRAM(s) Oral every 6 hours PRN  oxyCODONE    IR 10 milliGRAM(s) Oral every 4 hours PRN  pantoprazole    Tablet 40 milliGRAM(s) Oral before breakfast  senna 2 Tablet(s) Oral at bedtime  simethicone 80 milliGRAM(s) Chew three times a day  sodium chloride 0.9% lock flush 3 milliLiter(s) IV Push every 8 hours    T(C): 36.8 (09-19-23 @ 12:40), Max: 36.8 (09-19-23 @ 08:30)  HR: 70 (09-19-23 @ 12:40) (55 - 90)  BP: 110/69 (09-19-23 @ 12:40) (100/61 - 164/73)  RR: 18 (09-19-23 @ 12:40) (13 - 24)  SpO2: 97% (09-19-23 @ 12:40) (93% - 100%)  HEENT unremarkable  neck no JVD or bruit  heart normal S1 S2 RRR no gallops or rubs  chest clear to auscultation  abd sof nontender non distended +bs  ext no calf tenderness    A/P   DVT PX  pain control  bowel regimen   wound care as per ortho  GI PX  antiemetics prn  incentive spirometer

## 2023-09-19 NOTE — PHYSICAL THERAPY INITIAL EVALUATION ADULT - LEVEL OF INDEPENDENCE: STAIR NEGOTIATION, REHAB EVAL
*TO BE assessed (c/o fatigue and severe pain 1st session , will be seen again later for 2nd session for stair training

## 2023-09-19 NOTE — RAPID RESPONSE TEAM SUMMARY - NSADDTLFINDINGSRRT_GEN_ALL_CORE
CV: reg, jesus  lungs: cta bilat  abd: soft, ntnd, +bs  back: +MICHAEL w/ serosanguinous drainage  neuro: aaox3, no focal deficit  ext: no edema  skin: clammy

## 2023-09-19 NOTE — RAPID RESPONSE TEAM SUMMARY - NSOTHERINTERVENTIONSRRT_GEN_ALL_CORE
Critical Care time: 35 mins assessing presenting problems of acute illness that poses high probability of life threatening deterioration or end organ damage/dysfunction.  Medical decision making including Initiating plan of care, reviewing data, reviewing radiology, direct patient bedside evaluation and interpretation of vital signs, discussion with multidisciplinary team, discussing goals of care with patient/family, all non inclusive of procedures. This is a 43M POD 1 bilat laminectomy L4-S1 with RRT for hypotension c/w vasovagal episode.  Symptoms of dizzines and diaphoresis resolved with 1L IVF bolus and bedrest.   - pt instructed oob w/ assistance only  - cont to monitor  - ortho resident present at RRT, will manage post op pain  Critical Care time: 35 mins assessing presenting problems of acute illness that poses high probability of life threatening deterioration or end organ damage/dysfunction.  Medical decision making including Initiating plan of care, reviewing data, reviewing radiology, direct patient bedside evaluation and interpretation of vital signs, discussion with multidisciplinary team, discussing goals of care with patient/family, all non inclusive of procedures.

## 2023-09-19 NOTE — PHYSICAL THERAPY INITIAL EVALUATION ADULT - ADDITIONAL COMMENTS
"Elsie CANDELARIO RN BSN          " Pt states prior to admission he is independent in ADLs, does not use any walking device.

## 2023-09-19 NOTE — PROGRESS NOTE ADULT - SUBJECTIVE AND OBJECTIVE BOX
43yMale s/p B/L Laminectomy L4-S1 POD#1. Pt seen and examined in NAD. Pain controlled. Pt denies any new complaints. Pt denies CP/SOB/N/V/D/numbness/tingling/bowel or bladder dysfunction. Preop back pain now exchanged for post op back surgical pain. Preop LLE weakness and decreased sensation mostly unchanged. +scant flatus.   MICHAEL 120/270    PE:   Neuro: AAOX3  Spine: Dressing c/d/i   +MICHAEL with serosanguionous  Abd: Soft. Distended. Tympanic. No guarding  B/L UE: Skin intact. +ROM shoulder/elbow/wrist/fingers. +ok/thumbsup/fingercross signs.  strength: 5/5.  RP2+ NVI.  RLE:  +ROM hip/knee/ankle/toes. Ankle Dorsi/plantarflexion: 5/5. Calf: soft, compressible and nontender. DP/PT 2+ NVI  LLE:  +ROM hip/knee/ankle/toes. Ankle Dorsi/plantarflexion: 5/5. Calf: soft, compressible and nontender. DP/PT 2+ decreased sensation dorsal foot and later lower leg.                             13.1   10.31 )-----------( 162      ( 19 Sep 2023 06:00 )             38.3       09-19    136  |  103  |  18  ----------------------------<  142<H>  4.8   |  30  |  0.85    Ca    8.5      19 Sep 2023 06:00          A/P: 43yMale s/p Laminectomy L4-S1 POD#1.   Monitor and record MICHAEL drain output  Pain controlled  Monitor GI function: movantik and simethicone added   PT: WBAT - spinal precautions   DVT ppx: SCDs   Wound care, Isometric exercises, incentive spirometry reviewed with pt  Medical consult appreciated  Discharge: planning for home when MICHAEL drain removed   All the above discussed and understood by pt

## 2023-09-19 NOTE — PHYSICAL THERAPY INITIAL EVALUATION ADULT - LIVES WITH, PROFILE
Pt states he lives in pvt house with wife, son and dtr, 6 wide steps to enter the house, 1 flight or 13 steps with rail on one side, has tub shower in toilet.

## 2023-09-19 NOTE — DISCHARGE NOTE PROVIDER - NSDCFUADDAPPT_GEN_ALL_CORE_FT

## 2023-09-19 NOTE — PHYSICAL THERAPY INITIAL EVALUATION ADULT - PERTINENT HX OF CURRENT PROBLEM, REHAB EVAL
Pt states "I have back pains from injury at work." Prior to this admission he is independent in ADLS and ambulated without using assistive device.

## 2023-09-19 NOTE — DISCHARGE NOTE PROVIDER - HOSPITAL COURSE
43yMale with history of lumbar radiculopathy presenting for bilateral laminectomy L4-S1 by Dr. Rajiv Gallegos on 9/19/23. Risk and benefits of surgery were explained to the patient. The patient understood and agreed to proceed with surgery. Patient underwent the procedure with no intraoperative complications. Pt was brought in stable condition to the PACU. Once stable in PACU, pt was brought to the floor. During hospital stay pt was followed by Medicine,  during this admission. Pt hospital course was XX. Pt is stable for discharge to XX on POD# 43yMale with history of lumbar radiculopathy presenting for bilateral laminectomy L4-S1 by Dr. Rajiv Gallegos on 9/19/23. Risk and benefits of surgery were explained to the patient. The patient understood and agreed to proceed with surgery. Patient underwent the procedure with no intraoperative complications. Pt was brought in stable condition to the PACU. Once stable in PACU, pt was brought to the floor. During hospital stay pt was followed by Medicine,  during this admission. Pt had a RRT activation on POD#1 for a vaasovagal episode. He got IVF bolus and there were no repeated vasovagal events. Pt is stable for discharge to home on POD# 3 after MICHAEL drain came out and he had a BM.

## 2023-09-19 NOTE — OCCUPATIONAL THERAPY INITIAL EVALUATION ADULT - ADDITIONAL COMMENTS
Pt reports he lives with spouse in private house with 5 steps to enter with rail & 10 steps to reach second floor with rail. Pt was independent with ADLs and mobility prior to admission.

## 2023-09-19 NOTE — PHYSICAL THERAPY INITIAL EVALUATION ADULT - DIAGNOSIS, PT EVAL
c/o pain in the lumbar, decreased strength in the LE especially LLE, difficulty in bed mobility, transfers, unsteady gait.

## 2023-09-20 LAB
ANION GAP SERPL CALC-SCNC: 3 MMOL/L — LOW (ref 5–17)
BASOPHILS # BLD AUTO: 0.01 K/UL — SIGNIFICANT CHANGE UP (ref 0–0.2)
BASOPHILS NFR BLD AUTO: 0.1 % — SIGNIFICANT CHANGE UP (ref 0–2)
BUN SERPL-MCNC: 13 MG/DL — SIGNIFICANT CHANGE UP (ref 7–23)
CALCIUM SERPL-MCNC: 8.3 MG/DL — LOW (ref 8.5–10.1)
CHLORIDE SERPL-SCNC: 106 MMOL/L — SIGNIFICANT CHANGE UP (ref 96–108)
CO2 SERPL-SCNC: 31 MMOL/L — SIGNIFICANT CHANGE UP (ref 22–31)
CREAT SERPL-MCNC: 0.73 MG/DL — SIGNIFICANT CHANGE UP (ref 0.5–1.3)
EGFR: 115 ML/MIN/1.73M2 — SIGNIFICANT CHANGE UP
EOSINOPHIL # BLD AUTO: 0.04 K/UL — SIGNIFICANT CHANGE UP (ref 0–0.5)
EOSINOPHIL NFR BLD AUTO: 0.4 % — SIGNIFICANT CHANGE UP (ref 0–6)
GLUCOSE SERPL-MCNC: 115 MG/DL — HIGH (ref 70–99)
HCT VFR BLD CALC: 35.4 % — LOW (ref 39–50)
HGB BLD-MCNC: 11.9 G/DL — LOW (ref 13–17)
IMM GRANULOCYTES NFR BLD AUTO: 0.3 % — SIGNIFICANT CHANGE UP (ref 0–0.9)
LYMPHOCYTES # BLD AUTO: 2.14 K/UL — SIGNIFICANT CHANGE UP (ref 1–3.3)
LYMPHOCYTES # BLD AUTO: 21.9 % — SIGNIFICANT CHANGE UP (ref 13–44)
MCHC RBC-ENTMCNC: 30.7 PG — SIGNIFICANT CHANGE UP (ref 27–34)
MCHC RBC-ENTMCNC: 33.6 G/DL — SIGNIFICANT CHANGE UP (ref 32–36)
MCV RBC AUTO: 91.2 FL — SIGNIFICANT CHANGE UP (ref 80–100)
MONOCYTES # BLD AUTO: 0.88 K/UL — SIGNIFICANT CHANGE UP (ref 0–0.9)
MONOCYTES NFR BLD AUTO: 9 % — SIGNIFICANT CHANGE UP (ref 2–14)
NEUTROPHILS # BLD AUTO: 6.66 K/UL — SIGNIFICANT CHANGE UP (ref 1.8–7.4)
NEUTROPHILS NFR BLD AUTO: 68.3 % — SIGNIFICANT CHANGE UP (ref 43–77)
NRBC # BLD: 0 /100 WBCS — SIGNIFICANT CHANGE UP (ref 0–0)
PLATELET # BLD AUTO: 133 K/UL — LOW (ref 150–400)
POTASSIUM SERPL-MCNC: 3.8 MMOL/L — SIGNIFICANT CHANGE UP (ref 3.5–5.3)
POTASSIUM SERPL-SCNC: 3.8 MMOL/L — SIGNIFICANT CHANGE UP (ref 3.5–5.3)
RBC # BLD: 3.88 M/UL — LOW (ref 4.2–5.8)
RBC # FLD: 12.4 % — SIGNIFICANT CHANGE UP (ref 10.3–14.5)
SODIUM SERPL-SCNC: 140 MMOL/L — SIGNIFICANT CHANGE UP (ref 135–145)
WBC # BLD: 9.76 K/UL — SIGNIFICANT CHANGE UP (ref 3.8–10.5)
WBC # FLD AUTO: 9.76 K/UL — SIGNIFICANT CHANGE UP (ref 3.8–10.5)

## 2023-09-20 PROCEDURE — 99291 CRITICAL CARE FIRST HOUR: CPT

## 2023-09-20 RX ADMIN — OXYCODONE HYDROCHLORIDE 5 MILLIGRAM(S): 5 TABLET ORAL at 12:40

## 2023-09-20 RX ADMIN — SODIUM CHLORIDE 3 MILLILITER(S): 9 INJECTION INTRAMUSCULAR; INTRAVENOUS; SUBCUTANEOUS at 14:23

## 2023-09-20 RX ADMIN — OXYCODONE HYDROCHLORIDE 10 MILLIGRAM(S): 5 TABLET ORAL at 16:20

## 2023-09-20 RX ADMIN — Medication 650 MILLIGRAM(S): at 00:02

## 2023-09-20 RX ADMIN — OXYCODONE HYDROCHLORIDE 10 MILLIGRAM(S): 5 TABLET ORAL at 10:30

## 2023-09-20 RX ADMIN — OXYCODONE HYDROCHLORIDE 10 MILLIGRAM(S): 5 TABLET ORAL at 19:59

## 2023-09-20 RX ADMIN — CYCLOBENZAPRINE HYDROCHLORIDE 10 MILLIGRAM(S): 10 TABLET, FILM COATED ORAL at 15:25

## 2023-09-20 RX ADMIN — SODIUM CHLORIDE 3 MILLILITER(S): 9 INJECTION INTRAMUSCULAR; INTRAVENOUS; SUBCUTANEOUS at 07:41

## 2023-09-20 RX ADMIN — Medication 650 MILLIGRAM(S): at 11:40

## 2023-09-20 RX ADMIN — Medication 650 MILLIGRAM(S): at 17:55

## 2023-09-20 RX ADMIN — OXYCODONE HYDROCHLORIDE 10 MILLIGRAM(S): 5 TABLET ORAL at 09:33

## 2023-09-20 RX ADMIN — Medication 650 MILLIGRAM(S): at 07:43

## 2023-09-20 RX ADMIN — CYCLOBENZAPRINE HYDROCHLORIDE 10 MILLIGRAM(S): 10 TABLET, FILM COATED ORAL at 21:19

## 2023-09-20 RX ADMIN — SIMETHICONE 80 MILLIGRAM(S): 80 TABLET, CHEWABLE ORAL at 15:25

## 2023-09-20 RX ADMIN — OXYCODONE HYDROCHLORIDE 10 MILLIGRAM(S): 5 TABLET ORAL at 20:50

## 2023-09-20 RX ADMIN — NALOXEGOL OXALATE 25 MILLIGRAM(S): 12.5 TABLET, FILM COATED ORAL at 11:41

## 2023-09-20 RX ADMIN — Medication 650 MILLIGRAM(S): at 05:43

## 2023-09-20 RX ADMIN — SIMETHICONE 80 MILLIGRAM(S): 80 TABLET, CHEWABLE ORAL at 05:42

## 2023-09-20 RX ADMIN — Medication 650 MILLIGRAM(S): at 12:40

## 2023-09-20 RX ADMIN — OXYCODONE HYDROCHLORIDE 5 MILLIGRAM(S): 5 TABLET ORAL at 11:41

## 2023-09-20 RX ADMIN — SENNA PLUS 2 TABLET(S): 8.6 TABLET ORAL at 21:19

## 2023-09-20 RX ADMIN — Medication 650 MILLIGRAM(S): at 18:55

## 2023-09-20 RX ADMIN — CYCLOBENZAPRINE HYDROCHLORIDE 10 MILLIGRAM(S): 10 TABLET, FILM COATED ORAL at 05:43

## 2023-09-20 RX ADMIN — PANTOPRAZOLE SODIUM 40 MILLIGRAM(S): 20 TABLET, DELAYED RELEASE ORAL at 05:42

## 2023-09-20 RX ADMIN — Medication 650 MILLIGRAM(S): at 00:32

## 2023-09-20 RX ADMIN — OXYCODONE HYDROCHLORIDE 10 MILLIGRAM(S): 5 TABLET ORAL at 15:12

## 2023-09-20 RX ADMIN — SIMETHICONE 80 MILLIGRAM(S): 80 TABLET, CHEWABLE ORAL at 21:19

## 2023-09-20 NOTE — PROGRESS NOTE ADULT - SUBJECTIVE AND OBJECTIVE BOX
PRISCILA HOPE is a 43y Male s/p BILATERAL LAMINECTOMY L4-S1 WITH IMAGE    EXTENTION SURGERY    EXTENSIVE SURGERY        denies any chest pain shortness of breath palpitation dizziness lightheadedness nausea vomiting fever or chills    T(C): 36.8 (09-20-23 @ 11:20), Max: 37.3 (09-20-23 @ 05:00)  HR: 74 (09-20-23 @ 11:20) (63 - 80)  BP: 121/72 (09-20-23 @ 11:20) (97/73 - 123/75)  RR: 17 (09-20-23 @ 11:20) (16 - 18)  SpO2: 97% (09-20-23 @ 11:20) (95% - 98%)  no jvd/bruit  s1 s2 rrr  cta  s/nt/nd  no calf tend                        11.9   9.76  )-----------( 133      ( 20 Sep 2023 05:52 )             35.4   09-20    140  |  106  |  13  ----------------------------<  115<H>  3.8   |  31  |  0.73    Ca    8.3<L>      20 Sep 2023 05:52        cont dvt px  pain control  bowel regimen  antiemetics  incentive spirometer

## 2023-09-20 NOTE — PROGRESS NOTE ADULT - SUBJECTIVE AND OBJECTIVE BOX
43yMale s/p B/L Laminectomy L4-S1 POD#2. Pt seen and examined in NAD. Pain controlled. Pt denies any new complaints. Pt denies CP/SOB/N/V/D/numbness/tingling/bowel or bladder dysfunction. scant flatus    PE:   Neuro: AAOX3  Abd: protuberant. Distended. NTTP, no guarding  Spine: Dressing c/d/i +MICHAEL with serosanguinous   B/L UE: Skin intact. +ROM shoulder/elbow/wrist/fingers. +ok/thumbsup/fingercross signs.  strength: 5/5.  RP2+ NVI.   RLE: Skin intact. +ROM hip/knee/ankle/toes. Ankle Dorsi/plantarflexion: 5/5. Calf: soft, compressible and nontender. DP/PT 2+ NVI.  LLE: Skin intact. +ROM hip/knee/ankle/toes. Ankle Dorsi/plantarflexion: 5/5. Calf: soft, compressible and nontender. DP/PT 2+ Decreased sensation left lateral lower leg unchanged.                            11.9   9.76  )-----------( 133      ( 20 Sep 2023 05:52 )             35.4       09-20    140  |  106  |  13  ----------------------------<  115<H>  3.8   |  31  |  0.73    Ca    8.3<L>      20 Sep 2023 05:52          A/P: 43yMale s/p  B/L Laminectomy L4-S1 POD#2.   Blood pressures stable s/p RRT activation for vasovagal. Continue to monitor H&H  Monitor MICHAEL drain output   Pain controlled  F/U BM  PT: WBAT - spinal precautions   DVT ppx: SCDs   Wound care, Isometric exercises, incentive spirometry   Medical consult appreciated  Discharge: planning for home tomorrow pending MICHAEL drain removal  All the above discussed and understood by pt   D/W DR Zee

## 2023-09-21 ENCOUNTER — TRANSCRIPTION ENCOUNTER (OUTPATIENT)
Age: 43
End: 2023-09-21

## 2023-09-21 VITALS
TEMPERATURE: 99 F | HEART RATE: 80 BPM | DIASTOLIC BLOOD PRESSURE: 85 MMHG | OXYGEN SATURATION: 94 % | RESPIRATION RATE: 18 BRPM | SYSTOLIC BLOOD PRESSURE: 126 MMHG

## 2023-09-21 LAB
ANION GAP SERPL CALC-SCNC: 5 MMOL/L — SIGNIFICANT CHANGE UP (ref 5–17)
BASOPHILS # BLD AUTO: 0.02 K/UL — SIGNIFICANT CHANGE UP (ref 0–0.2)
BASOPHILS NFR BLD AUTO: 0.2 % — SIGNIFICANT CHANGE UP (ref 0–2)
BUN SERPL-MCNC: 11 MG/DL — SIGNIFICANT CHANGE UP (ref 7–23)
CALCIUM SERPL-MCNC: 8.4 MG/DL — LOW (ref 8.5–10.1)
CHLORIDE SERPL-SCNC: 101 MMOL/L — SIGNIFICANT CHANGE UP (ref 96–108)
CO2 SERPL-SCNC: 30 MMOL/L — SIGNIFICANT CHANGE UP (ref 22–31)
CREAT SERPL-MCNC: 0.64 MG/DL — SIGNIFICANT CHANGE UP (ref 0.5–1.3)
EGFR: 120 ML/MIN/1.73M2 — SIGNIFICANT CHANGE UP
EOSINOPHIL # BLD AUTO: 0.19 K/UL — SIGNIFICANT CHANGE UP (ref 0–0.5)
EOSINOPHIL NFR BLD AUTO: 2.3 % — SIGNIFICANT CHANGE UP (ref 0–6)
GLUCOSE SERPL-MCNC: 101 MG/DL — HIGH (ref 70–99)
HCT VFR BLD CALC: 35.2 % — LOW (ref 39–50)
HGB BLD-MCNC: 12 G/DL — LOW (ref 13–17)
IMM GRANULOCYTES NFR BLD AUTO: 0.5 % — SIGNIFICANT CHANGE UP (ref 0–0.9)
LYMPHOCYTES # BLD AUTO: 1.95 K/UL — SIGNIFICANT CHANGE UP (ref 1–3.3)
LYMPHOCYTES # BLD AUTO: 24 % — SIGNIFICANT CHANGE UP (ref 13–44)
MCHC RBC-ENTMCNC: 30.9 PG — SIGNIFICANT CHANGE UP (ref 27–34)
MCHC RBC-ENTMCNC: 34.1 G/DL — SIGNIFICANT CHANGE UP (ref 32–36)
MCV RBC AUTO: 90.7 FL — SIGNIFICANT CHANGE UP (ref 80–100)
MONOCYTES # BLD AUTO: 0.75 K/UL — SIGNIFICANT CHANGE UP (ref 0–0.9)
MONOCYTES NFR BLD AUTO: 9.2 % — SIGNIFICANT CHANGE UP (ref 2–14)
NEUTROPHILS # BLD AUTO: 5.16 K/UL — SIGNIFICANT CHANGE UP (ref 1.8–7.4)
NEUTROPHILS NFR BLD AUTO: 63.8 % — SIGNIFICANT CHANGE UP (ref 43–77)
NRBC # BLD: 0 /100 WBCS — SIGNIFICANT CHANGE UP (ref 0–0)
PLATELET # BLD AUTO: 134 K/UL — LOW (ref 150–400)
POTASSIUM SERPL-MCNC: 3.8 MMOL/L — SIGNIFICANT CHANGE UP (ref 3.5–5.3)
POTASSIUM SERPL-SCNC: 3.8 MMOL/L — SIGNIFICANT CHANGE UP (ref 3.5–5.3)
RBC # BLD: 3.88 M/UL — LOW (ref 4.2–5.8)
RBC # FLD: 12.4 % — SIGNIFICANT CHANGE UP (ref 10.3–14.5)
SODIUM SERPL-SCNC: 136 MMOL/L — SIGNIFICANT CHANGE UP (ref 135–145)
WBC # BLD: 8.11 K/UL — SIGNIFICANT CHANGE UP (ref 3.8–10.5)
WBC # FLD AUTO: 8.11 K/UL — SIGNIFICANT CHANGE UP (ref 3.8–10.5)

## 2023-09-21 RX ORDER — ACETAMINOPHEN 500 MG
2 TABLET ORAL
Qty: 0 | Refills: 0 | DISCHARGE
Start: 2023-09-21

## 2023-09-21 RX ORDER — OXYCODONE HYDROCHLORIDE 5 MG/1
1 TABLET ORAL
Qty: 42 | Refills: 0
Start: 2023-09-21 | End: 2023-09-27

## 2023-09-21 RX ORDER — SENNA PLUS 8.6 MG/1
2 TABLET ORAL
Qty: 0 | Refills: 0 | DISCHARGE
Start: 2023-09-21

## 2023-09-21 RX ORDER — NALOXONE HYDROCHLORIDE 4 MG/.1ML
4 SPRAY NASAL
Qty: 1 | Refills: 0
Start: 2023-09-21 | End: 2023-09-21

## 2023-09-21 RX ORDER — CYCLOBENZAPRINE HYDROCHLORIDE 10 MG/1
1 TABLET, FILM COATED ORAL
Qty: 21 | Refills: 0
Start: 2023-09-21 | End: 2023-09-27

## 2023-09-21 RX ADMIN — Medication 650 MILLIGRAM(S): at 06:40

## 2023-09-21 RX ADMIN — OXYCODONE HYDROCHLORIDE 10 MILLIGRAM(S): 5 TABLET ORAL at 10:35

## 2023-09-21 RX ADMIN — Medication 650 MILLIGRAM(S): at 12:50

## 2023-09-21 RX ADMIN — PANTOPRAZOLE SODIUM 40 MILLIGRAM(S): 20 TABLET, DELAYED RELEASE ORAL at 05:41

## 2023-09-21 RX ADMIN — OXYCODONE HYDROCHLORIDE 10 MILLIGRAM(S): 5 TABLET ORAL at 13:53

## 2023-09-21 RX ADMIN — Medication 650 MILLIGRAM(S): at 00:24

## 2023-09-21 RX ADMIN — OXYCODONE HYDROCHLORIDE 10 MILLIGRAM(S): 5 TABLET ORAL at 17:41

## 2023-09-21 RX ADMIN — SIMETHICONE 80 MILLIGRAM(S): 80 TABLET, CHEWABLE ORAL at 05:41

## 2023-09-21 RX ADMIN — SODIUM CHLORIDE 100 MILLILITER(S): 9 INJECTION, SOLUTION INTRAVENOUS at 05:40

## 2023-09-21 RX ADMIN — OXYCODONE HYDROCHLORIDE 10 MILLIGRAM(S): 5 TABLET ORAL at 09:35

## 2023-09-21 RX ADMIN — OXYCODONE HYDROCHLORIDE 10 MILLIGRAM(S): 5 TABLET ORAL at 05:41

## 2023-09-21 RX ADMIN — Medication 650 MILLIGRAM(S): at 11:59

## 2023-09-21 RX ADMIN — Medication 650 MILLIGRAM(S): at 05:41

## 2023-09-21 RX ADMIN — Medication 10 MILLIGRAM(S): at 09:24

## 2023-09-21 RX ADMIN — Medication 650 MILLIGRAM(S): at 17:42

## 2023-09-21 RX ADMIN — NALOXEGOL OXALATE 25 MILLIGRAM(S): 12.5 TABLET, FILM COATED ORAL at 12:00

## 2023-09-21 RX ADMIN — OXYCODONE HYDROCHLORIDE 10 MILLIGRAM(S): 5 TABLET ORAL at 00:24

## 2023-09-21 RX ADMIN — Medication 650 MILLIGRAM(S): at 01:20

## 2023-09-21 RX ADMIN — SODIUM CHLORIDE 3 MILLILITER(S): 9 INJECTION INTRAMUSCULAR; INTRAVENOUS; SUBCUTANEOUS at 05:44

## 2023-09-21 RX ADMIN — OXYCODONE HYDROCHLORIDE 10 MILLIGRAM(S): 5 TABLET ORAL at 14:50

## 2023-09-21 RX ADMIN — OXYCODONE HYDROCHLORIDE 10 MILLIGRAM(S): 5 TABLET ORAL at 06:40

## 2023-09-21 RX ADMIN — OXYCODONE HYDROCHLORIDE 10 MILLIGRAM(S): 5 TABLET ORAL at 01:20

## 2023-09-21 RX ADMIN — SODIUM CHLORIDE 3 MILLILITER(S): 9 INJECTION INTRAMUSCULAR; INTRAVENOUS; SUBCUTANEOUS at 16:03

## 2023-09-21 RX ADMIN — Medication 5 MILLIGRAM(S): at 06:59

## 2023-09-21 RX ADMIN — CYCLOBENZAPRINE HYDROCHLORIDE 10 MILLIGRAM(S): 10 TABLET, FILM COATED ORAL at 06:37

## 2023-09-21 NOTE — DISCHARGE NOTE NURSING/CASE MANAGEMENT/SOCIAL WORK - NSDCFUADDAPPT_GEN_ALL_CORE_FT

## 2023-09-21 NOTE — PROGRESS NOTE ADULT - SUBJECTIVE AND OBJECTIVE BOX
PRISCILA HOPE is a 43y Male s/p BILATERAL LAMINECTOMY L4-S1 WITH IMAGE    EXTENTION SURGERY    EXTENSIVE SURGERY        denies any chest pain shortness of breath palpitation dizziness lightheadedness nausea vomiting fever or chills    T(C): 36.7 (09-21-23 @ 05:00), Max: 36.7 (09-20-23 @ 16:45)  HR: 78 (09-21-23 @ 10:50) (67 - 78)  BP: 125/73 (09-21-23 @ 10:50) (113/75 - 127/87)  RR: 18 (09-21-23 @ 10:50) (16 - 18)  SpO2: 98% (09-21-23 @ 10:50) (94% - 98%)  no jvd/bruit  s1 s2 rrr  cta  s/nt/nd  no calf tend                        12.0   8.11  )-----------( 134      ( 21 Sep 2023 06:40 )             35.2   09-21    136  |  101  |  11  ----------------------------<  101<H>  3.8   |  30  |  0.64    Ca    8.4<L>      21 Sep 2023 06:40        cont dvt px  pain control  bowel regimen  antiemetics  incentive spirometer

## 2023-09-21 NOTE — DISCHARGE NOTE NURSING/CASE MANAGEMENT/SOCIAL WORK - PATIENT PORTAL LINK FT
You can access the FollowMyHealth Patient Portal offered by [facility] by registering at the following website: [email or web address]. By joining [email or web address]’s FollowMyHealth portal, you will also be able to view your health information using other applications (apps) compatible with our system.

## 2023-09-21 NOTE — PROGRESS NOTE ADULT - SUBJECTIVE AND OBJECTIVE BOX
43yMale s/p B/L Laminectomy L4-S1 POD#3. Pt seen and examined in NAD. Pain controlled. Pt denies any new complaints. Pt denies CP/SOB/N/V/D/numbness/tingling/bowel or bladder dysfunction. scant flatus  MICHAEL: 50/140    PE:   Neuro: AAOX3  Abd: protuberant. Distended. NTTP, no guarding  Spine: Dressing c/d/i +MICHAEL with serosanguinous Incision: Sutures C/D/I. No collection or hematoma.   B/L UE: Skin intact. +ROM shoulder/elbow/wrist/fingers. +ok/thumbsup/fingercross signs.  strength: 5/5.  RP2+ NVI.   RLE: Skin intact. +ROM hip/knee/ankle/toes. Ankle Dorsi/plantarflexion: 5/5. Calf: soft, compressible and nontender. DP/PT 2+ NVI.  LLE: Skin intact. +ROM hip/knee/ankle/toes. Ankle Dorsi/plantarflexion: 5/5. Calf: soft, compressible and nontender. DP/PT 2+ Decreased sensation left lateral lower leg unchanged.                                       12.0   8.11  )-----------( 134      ( 21 Sep 2023 06:40 )             35.2     09-21    136  |  101  |  11  ----------------------------<  101<H>  3.8   |  30  |  0.64    Ca    8.4<L>      21 Sep 2023 06:40              A/P: 43yMale s/p  B/L Laminectomy L4-S1 POD#3   MICHAEL drain DC'd, tip intact. New dry clean dressing applied   Pain controlled  S/P BM today - GI motility counseled   PT: WBAT - spinal precautions   DVT ppx: SCDs   Wound care, Isometric exercises, incentive spirometry   Medical consult appreciated  Discharge: planning for home today  Pt seen in am with DR Zee
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
## 2023-09-25 DIAGNOSIS — M51.17 INTERVERTEBRAL DISC DISORDERS WITH RADICULOPATHY, LUMBOSACRAL REGION: ICD-10-CM

## 2023-09-25 DIAGNOSIS — Q76.49 OTHER CONGENITAL MALFORMATIONS OF SPINE, NOT ASSOCIATED WITH SCOLIOSIS: ICD-10-CM

## 2023-09-25 DIAGNOSIS — I95.9 HYPOTENSION, UNSPECIFIED: ICD-10-CM

## 2023-09-25 DIAGNOSIS — M51.16 INTERVERTEBRAL DISC DISORDERS WITH RADICULOPATHY, LUMBAR REGION: ICD-10-CM

## 2023-09-28 ENCOUNTER — APPOINTMENT (OUTPATIENT)
Dept: ORTHOPEDIC SURGERY | Facility: CLINIC | Age: 43
End: 2023-09-28

## 2023-10-10 ENCOUNTER — APPOINTMENT (OUTPATIENT)
Dept: ORTHOPEDIC SURGERY | Facility: CLINIC | Age: 43
End: 2023-10-10
Payer: COMMERCIAL

## 2023-10-10 PROCEDURE — 72100 X-RAY EXAM L-S SPINE 2/3 VWS: CPT

## 2023-10-10 PROCEDURE — 99024 POSTOP FOLLOW-UP VISIT: CPT

## 2023-10-31 ENCOUNTER — APPOINTMENT (OUTPATIENT)
Dept: ORTHOPEDIC SURGERY | Facility: CLINIC | Age: 43
End: 2023-10-31
Payer: COMMERCIAL

## 2023-10-31 DIAGNOSIS — M54.16 RADICULOPATHY, LUMBAR REGION: ICD-10-CM

## 2023-10-31 PROCEDURE — 99024 POSTOP FOLLOW-UP VISIT: CPT

## 2024-01-02 ENCOUNTER — APPOINTMENT (OUTPATIENT)
Dept: ORTHOPEDIC SURGERY | Facility: CLINIC | Age: 44
End: 2024-01-02
Payer: COMMERCIAL

## 2024-01-02 VITALS — BODY MASS INDEX: 36.4 KG/M2 | HEIGHT: 71 IN | WEIGHT: 260 LBS

## 2024-01-02 PROCEDURE — 99214 OFFICE O/P EST MOD 30 MIN: CPT

## 2024-01-02 NOTE — IMAGING
[Disc space narrowing] : Disc space narrowing [de-identified] : LSPINE Inspection: healed incision Palpation: L SIJ TTP ROM: limited all planes Strength: 5/5 RIGHT hip flexors, knee extensors, ankle dorsiflexors, EHL, ankle plantarflexors. Left TA/EHL 5/5 Sensation: Sensation present to light touch bilateral L2-S1 distributions, N/T posterolateral L leg to dorsal and plantar foot Provocative maneuvers - B/L SLR  Bilateral hips- Palpation: No tenderness to palpation over greater trochanter or IT band ROM: No pain with flexion and internal rotation  [FreeTextEntry1] : L4-S1 lami

## 2024-01-02 NOTE — HISTORY OF PRESENT ILLNESS
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 There is no acute vertebral  body fracture. The conus appears unremarkable. There are no gross paravertebral soft tissue masses. L1-L2: There is no posterior disc herniation. L2-L3: There is disc bulging, broad-based posterior disc herniation, mild central stenosis, bilateral facet arthrosis and moderate bilateral foraminal narrowing with impingement upon the right exiting L2 nerve root. L3-L4: There is disc bulging, bony ridging, facet arthrosis, broad-based posterior disc herniation, moderate central stenosis, and moderate bilateral foraminal narrowing. L4-L5: There is disc bulging, bony ridging, broad-based posterior disc herniation, facet arthrosis, severe central stenosis, broad-based posterior disc herniation, bilateral exiting L4 nerve root impingement. L5-S1: There is disc bulging, bony ridging, facet arthrosis, broad-based posterior disc herniation asymmetric on the left, moderate central stenosis, left S1 nerve root impingement, and bilateral exiting L5 nerve root impingement. Ind. review-  Congenital stenosis; L4/5 bulge with central and b/l LR and NF stenosis; L5/S1 central and b/l NF stenosis w/ L paracentral HNP abutting traversing root ------------------------------ 4/11/23- PRISCILA 43 year old M here for Lower back, onset pain since 2/11-18/23, pt was getting out a truck and slipped on ice, pt felt the pain and was unable to walk, he was taken to the ER, pt completed x rays pt was given muscle relaxer and pain medication , which provided only temporary relief, managed by hospital and PCP numbness down the LLE to the toes. + subjective L foot weakness pt tried a chiropractor but the relief was only during the sessions  No bb dysfunction 5/30/23- Still pain radiating down the LLE. MDP helped with pain, taking nsaid and iam with mild relief. No bb dysfunction or saddle anesthesia symptoms. Did not have cramping back or leg pain and was able to walk a significant distance prior to this fall.  10/10/23- ache in the back. Improving LLE radic 10/31*/23- still back ache. N/T still, though pain down the LLE improving 1/2/24: Finished PT last week. Much less pain in the LLE, still some N [] : no [FreeTextEntry6] : pinching sensation  [de-identified] : bending [de-identified] : 11/2023 [de-identified] : PCP [de-identified] : x ray  [de-identified] : 9.18.2023

## 2024-01-02 NOTE — ASSESSMENT
[FreeTextEntry1] : 9/18/23 L4-S1 bilateral laminectomy PT  NSAIDs- Patient warned of risk of medication to GI tract, increased blood pressure, cardiac risk, and risk of fluid retention. Advised to clear medication with internist or PCP if any concurrent health problem with heart, blood pressure, or GI system exists.  Gabapentin- Patient advised of sedating effects, instructed not to drive, operate machinery, or take with other sedating medications. Advised of need to taper on/off medication and risk of abruptly stopping gabapentin.

## 2024-03-05 ENCOUNTER — APPOINTMENT (OUTPATIENT)
Dept: ORTHOPEDIC SURGERY | Facility: CLINIC | Age: 44
End: 2024-03-05

## 2024-04-09 ENCOUNTER — APPOINTMENT (OUTPATIENT)
Dept: ORTHOPEDIC SURGERY | Facility: CLINIC | Age: 44
End: 2024-04-09

## 2024-05-07 ENCOUNTER — APPOINTMENT (OUTPATIENT)
Dept: ORTHOPEDIC SURGERY | Facility: CLINIC | Age: 44
End: 2024-05-07
Payer: COMMERCIAL

## 2024-05-07 VITALS — WEIGHT: 260 LBS | BODY MASS INDEX: 36.4 KG/M2 | HEIGHT: 71 IN

## 2024-05-07 DIAGNOSIS — Z98.890 OTHER SPECIFIED POSTPROCEDURAL STATES: ICD-10-CM

## 2024-05-07 PROCEDURE — 99213 OFFICE O/P EST LOW 20 MIN: CPT

## 2024-05-07 NOTE — ASSESSMENT
[FreeTextEntry1] : 9/18/23 L4-S1 bilateral laminectomy RTC 4 months for clinical check   NSAIDs- Patient warned of risk of medication to GI tract, increased blood pressure, cardiac risk, and risk of fluid retention. Advised to clear medication with internist or PCP if any concurrent health problem with heart, blood pressure, or GI system exists.  Gabapentin- Patient advised of sedating effects, instructed not to drive, operate machinery, or take with other sedating medications. Advised of need to taper on/off medication and risk of abruptly stopping gabapentin.

## 2024-05-07 NOTE — HISTORY OF PRESENT ILLNESS
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 There is no acute vertebral  body fracture. The conus appears unremarkable. There are no gross paravertebral soft tissue masses. L1-L2: There is no posterior disc herniation. L2-L3: There is disc bulging, broad-based posterior disc herniation, mild central stenosis, bilateral facet arthrosis and moderate bilateral foraminal narrowing with impingement upon the right exiting L2 nerve root. L3-L4: There is disc bulging, bony ridging, facet arthrosis, broad-based posterior disc herniation, moderate central stenosis, and moderate bilateral foraminal narrowing. L4-L5: There is disc bulging, bony ridging, broad-based posterior disc herniation, facet arthrosis, severe central stenosis, broad-based posterior disc herniation, bilateral exiting L4 nerve root impingement. L5-S1: There is disc bulging, bony ridging, facet arthrosis, broad-based posterior disc herniation asymmetric on the left, moderate central stenosis, left S1 nerve root impingement, and bilateral exiting L5 nerve root impingement. Ind. review-  Congenital stenosis; L4/5 bulge with central and b/l LR and NF stenosis; L5/S1 central and b/l NF stenosis w/ L paracentral HNP abutting traversing root ------------------------------------------------------------------------------------------------------------------------------------- 4/11/23- PRISCILA 43 year old M here for Lower back, onset pain since 2/11-18/23, pt was getting out a truck and slipped on ice, pt felt the pain and was unable to walk, he was taken to the ER, pt completed x rays pt was given muscle relaxer and pain medication , which provided only temporary relief, managed by hospital and PCP numbness down the LLE to the toes. + subjective L foot weakness pt tried a chiropractor but the relief was only during the sessions  No bb dysfunction 5/30/23- Still pain radiating down the LLE. MDP helped with pain, taking nsaid and iam with mild relief. No bb dysfunction or saddle anesthesia symptoms. Did not have cramping back or leg pain and was able to walk a significant distance prior to this fall.  10/10/23- ache in the back. Improving LLE radic 10/31*/23- still back ache. N/T still, though pain down the LLE improving 1/2/24: Finished PT last week. Much less pain in the LLE, still some N 5/7/24- almost 8 months out. Low back fatigue improving. N/T in the LLE continues to decrease.  [] : no [FreeTextEntry6] : pinching sensation  [de-identified] : bending [de-identified] : 11/2023 [de-identified] : PCP [de-identified] : x ray  [de-identified] : 9.18.2023

## 2024-05-07 NOTE — IMAGING
[de-identified] : LSPINE Inspection: healed incision Palpation: no TTP  ROM: limited all planes Strength: 5/5 RIGHT hip flexors, knee extensors, ankle dorsiflexors, EHL, ankle plantarflexors. Left TA/EHL 5/5 Sensation: Sensation present to light touch bilateral L2-S1 distributions, N/T posterolateral L leg to dorsal and plantar foot Provocative maneuvers - B/L SLR  Bilateral hips- Palpation: No tenderness to palpation over greater trochanter or IT band ROM: No pain with flexion and internal rotation

## 2024-09-10 ENCOUNTER — APPOINTMENT (OUTPATIENT)
Dept: ORTHOPEDIC SURGERY | Facility: CLINIC | Age: 44
End: 2024-09-10

## 2024-10-15 ENCOUNTER — APPOINTMENT (OUTPATIENT)
Dept: ORTHOPEDIC SURGERY | Facility: CLINIC | Age: 44
End: 2024-10-15
